# Patient Record
Sex: MALE | Race: WHITE | NOT HISPANIC OR LATINO | Employment: STUDENT | URBAN - METROPOLITAN AREA
[De-identification: names, ages, dates, MRNs, and addresses within clinical notes are randomized per-mention and may not be internally consistent; named-entity substitution may affect disease eponyms.]

---
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
## 2022-08-17 ENCOUNTER — OFFICE VISIT (OUTPATIENT)
Dept: INTERNAL MEDICINE | Facility: CLINIC | Age: 22
End: 2022-08-17

## 2022-08-17 VITALS
HEIGHT: 74 IN | DIASTOLIC BLOOD PRESSURE: 74 MMHG | SYSTOLIC BLOOD PRESSURE: 132 MMHG | WEIGHT: 193.8 LBS | OXYGEN SATURATION: 98 % | HEART RATE: 50 BPM | BODY MASS INDEX: 24.87 KG/M2

## 2022-08-17 DIAGNOSIS — Z00.00 ENCOUNTER FOR MEDICAL EXAMINATION TO ESTABLISH CARE: Primary | ICD-10-CM

## 2022-08-17 DIAGNOSIS — F41.0 PANIC ATTACKS: ICD-10-CM

## 2022-08-17 DIAGNOSIS — F41.9 ANXIETY: ICD-10-CM

## 2022-08-17 PROCEDURE — 99214 OFFICE O/P EST MOD 30 MIN: CPT | Performed by: NURSE PRACTITIONER

## 2022-08-17 RX ORDER — HYDROXYZINE HYDROCHLORIDE 25 MG/1
25 TABLET, FILM COATED ORAL 2 TIMES DAILY PRN
Qty: 60 TABLET | Refills: 0 | Status: SHIPPED | OUTPATIENT
Start: 2022-08-17 | End: 2022-09-16

## 2022-08-17 RX ORDER — ESCITALOPRAM OXALATE 10 MG/1
10 TABLET ORAL DAILY
Qty: 30 TABLET | Refills: 1 | Status: SHIPPED | OUTPATIENT
Start: 2022-08-17 | End: 2022-09-16

## 2022-08-17 NOTE — PROGRESS NOTES
"Chief Complaint  new patient visit and Anxiety    Subjective        Isaias Hardin presents to [facility]  History of Present Illness    Patient is a pleasant 21 year old male who is new to me and new to the office. He is here today for an establish care visit and for his history of anxiety.   He use to take Lexapro 10 mg tablet daily and reports it was therapeutic at the time.   He has not taken this medication in over two years.     He reports not being able to sleep d/t anxiety with \"sweaty palms\" and constant thoughts.   Denies any thoughts of self harm or harm to others at this time.     Objective   Vital Signs:  /74 (BP Location: Right arm, Patient Position: Sitting, Cuff Size: Large Adult)   Pulse 50   Ht 188 cm (74\")   Wt 87.9 kg (193 lb 12.8 oz)   SpO2 98%   BMI 24.88 kg/m²   Estimated body mass index is 24.88 kg/m² as calculated from the following:    Height as of this encounter: 188 cm (74\").    Weight as of this encounter: 87.9 kg (193 lb 12.8 oz).    BMI is within normal parameters. No other follow-up for BMI required.      Physical Exam  Vitals and nursing note reviewed.   HENT:      Head: Normocephalic.      Mouth/Throat:      Mouth: Mucous membranes are moist.   Eyes:      Pupils: Pupils are equal, round, and reactive to light.   Cardiovascular:      Rate and Rhythm: Regular rhythm. Bradycardia present.      Pulses: Normal pulses.      Heart sounds: Normal heart sounds.      Comments: No peripheral edema noted.   Pulmonary:      Effort: Pulmonary effort is normal. No respiratory distress.      Breath sounds: Normal breath sounds. No stridor. No wheezing, rhonchi or rales.      Comments: Denies SOB  Chest:      Chest wall: No tenderness.   Musculoskeletal:         General: Normal range of motion.   Skin:     General: Skin is warm.      Capillary Refill: Capillary refill takes less than 2 seconds.   Neurological:      Mental Status: He is alert and oriented to " person, place, and time.   Psychiatric:      Comments: Denies self-harm or harm to others.  Unable to sleep due to anxiety increased blood process  Sweaty palms        Result Review :           CBC (No Diff) (05/14/2020 15:50)  [facility] with Og Zelaya MD (06/18/2021)       Assessment and Plan   Diagnoses and all orders for this visit:    1. Encounter for medical examination to establish care (Primary)    2. Anxiety    3. Panic attacks    Other orders  -     escitalopram (LEXAPRO) 10 MG tablet; Take 1 tablet by mouth Daily for 30 days.  Dispense: 30 tablet; Refill: 1  -     hydrOXYzine (ATARAX) 25 MG tablet; Take 1 tablet by mouth 2 (Two) Times a Day As Needed for Itching for up to 30 days.  Dispense: 60 tablet; Refill: 0    Patient is aware to stop at the pharmacy and  his prescriptions and take as directed.  Patient will take the hydroxyzine as needed only up to 2 times a day every 8 hours.  Patient is aware this medication can cause drowsiness and only take as needed at bedtime and not with any other substances such as alcohol.  We will follow-up with patient in 6 weeks for his annual physical exam and recheck on anxiety.  Patient agrees with this treatment at this time.         Follow Up   Return in about 6 weeks (around 9/28/2022) for Annual physical.  Patient was given instructions and counseling regarding his condition or for health maintenance advice. Please see specific information pulled into the AVS if appropriate.

## 2023-09-15 ENCOUNTER — OFFICE VISIT (OUTPATIENT)
Dept: INTERNAL MEDICINE | Facility: CLINIC | Age: 23
End: 2023-09-15
Payer: COMMERCIAL

## 2023-09-15 VITALS
RESPIRATION RATE: 20 BRPM | TEMPERATURE: 96.7 F | OXYGEN SATURATION: 98 % | SYSTOLIC BLOOD PRESSURE: 120 MMHG | WEIGHT: 181 LBS | HEART RATE: 89 BPM | BODY MASS INDEX: 23.23 KG/M2 | DIASTOLIC BLOOD PRESSURE: 70 MMHG | HEIGHT: 74 IN

## 2023-09-15 DIAGNOSIS — Z11.59 NEED FOR HEPATITIS C SCREENING TEST: ICD-10-CM

## 2023-09-15 DIAGNOSIS — F41.9 ANXIETY: ICD-10-CM

## 2023-09-15 DIAGNOSIS — Z13.1 SCREENING FOR DIABETES MELLITUS: ICD-10-CM

## 2023-09-15 DIAGNOSIS — Z13.220 SCREENING, LIPID: ICD-10-CM

## 2023-09-15 DIAGNOSIS — Z00.00 ANNUAL PHYSICAL EXAM: Primary | ICD-10-CM

## 2023-09-15 RX ORDER — HYDROXYZINE HYDROCHLORIDE 25 MG/1
25 TABLET, FILM COATED ORAL 2 TIMES DAILY PRN
Qty: 60 TABLET | Refills: 1 | Status: SHIPPED | OUTPATIENT
Start: 2023-09-15 | End: 2023-10-15

## 2023-09-15 RX ORDER — ESCITALOPRAM OXALATE 10 MG/1
10 TABLET ORAL DAILY
Qty: 90 TABLET | Refills: 1 | Status: SHIPPED | OUTPATIENT
Start: 2023-09-15 | End: 2023-12-14

## 2023-09-15 NOTE — PROGRESS NOTES
"Chief Complaint  Anxiety  Annual physical exam    Subjective        Isaias Hardin presents to [facility]  History of Present Illness    Patient is a pleasant 23-year-old male who I have seen at a previous office.  Patient is here today to obtain his annual physical exam and to discuss his ongoing issue/chronic issue with anxiety that is unstable at this time.  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  His family lives here in [place] when he comes home quite often.    He is UTD on dental and vision screenings.    Exercising regularly and diet is not so well at this time.    Reports normal bowel and bladder pattern.    No other problems on today's office visit.    Objective   Vital Signs:  /70   Pulse 89   Temp 96.7 °F (35.9 °C)   Resp 20   Ht 188 cm (74.02\")   Wt 82.1 kg (181 lb)   SpO2 98%   BMI 23.23 kg/m²   Estimated body mass index is 23.23 kg/m² as calculated from the following:    Height as of this encounter: 188 cm (74.02\").    Weight as of this encounter: 82.1 kg (181 lb).       BMI is within normal parameters. No other follow-up for BMI required.      Physical Exam  Vitals and nursing note reviewed.   Constitutional:       Appearance: Normal appearance.   HENT:      Head: Normocephalic.      Right Ear: Tympanic membrane, ear canal and external ear normal. There is no impacted cerumen.      Left Ear: Tympanic membrane, ear canal and external ear normal. There is no impacted cerumen.      Nose: Nose normal.      Mouth/Throat:      Mouth: Mucous membranes are moist.      Pharynx: No oropharyngeal exudate or posterior oropharyngeal erythema.   Eyes:      Pupils: Pupils are equal, round, and reactive to light.   Cardiovascular:      Rate and Rhythm: Normal rate and regular rhythm.      Pulses: Normal pulses.      Heart sounds: Normal heart sounds.      Comments: No peripheral edema noted  Pulmonary:      " Effort: Pulmonary effort is normal. No respiratory distress.      Breath sounds: Normal breath sounds. No stridor. No wheezing, rhonchi or rales.      Comments: Denies shortness of breath  Chest:      Chest wall: No tenderness.   Musculoskeletal:         General: Normal range of motion.   Skin:     General: Skin is warm.      Capillary Refill: Capillary refill takes less than 2 seconds.   Neurological:      Mental Status: He is alert and oriented to person, place, and time.   Psychiatric:         Behavior: Behavior normal.      Result Review :               Assessment and Plan   Diagnoses and all orders for this visit:    1. Annual physical exam (Primary)  -     CBC & Differential  -     Comprehensive Metabolic Panel  -     Hemoglobin A1c  -     Lipid Panel  -     Hepatitis C Antibody    2. Need for hepatitis C screening test  -     CBC & Differential  -     Comprehensive Metabolic Panel  -     Hemoglobin A1c  -     Lipid Panel  -     Hepatitis C Antibody    3. Screening, lipid  -     CBC & Differential  -     Comprehensive Metabolic Panel  -     Hemoglobin A1c  -     Lipid Panel  -     Hepatitis C Antibody    4. Screening for diabetes mellitus  -     CBC & Differential  -     Comprehensive Metabolic Panel  -     Hemoglobin A1c  -     Lipid Panel  -     Hepatitis C Antibody    5. Anxiety  Assessment & Plan:  Chronic/unstable  Patient has been on Lexapro 10 mg in the past and hydroxyzine as needed for moments of panic.  Patient is requesting a refill of this medication at this time.  Patient is aware the hydroxyzine will cause drowsiness and will only take it as needed for moments of panic.  Denies any thoughts of suicidal ideation at this time.      Other orders  -     escitalopram (LEXAPRO) 10 MG tablet; Take 1 tablet by mouth Daily for 90 days.  Dispense: 90 tablet; Refill: 1  -     hydrOXYzine (ATARAX) 25 MG tablet; Take 1 tablet by mouth 2 (Two) Times a Day As Needed for Itching for up to 30 days.  Dispense: 60  tablet; Refill: 1    Patient will stop at the pharmacy and  his prescriptions for Lexapro and hydroxyzine and take as directed.  Patient will return in 8 weeks for a video conference to discuss anxiety and medication management.  Patient is aware if he develops any worsening symptoms of anxiety he will contact the office immediately.  Screening testing was completed on today's office visit we will contact him when those return.  Patient agrees to treatment plan at this time.  Patient will focus on managing healthy dietary pattern at this time.  He works out regularly and will continue to do so.       I spent 30 minutes caring for Isaias on this date of service. This time includes time spent by me in the following activities:preparing for the visit, reviewing tests, obtaining and/or reviewing a separately obtained history, performing a medically appropriate examination and/or evaluation , counseling and educating the patient/family/caregiver, ordering medications, tests, or procedures, referring and communicating with other health care professionals , documenting information in the medical record, independently interpreting results and communicating that information with the patient/family/caregiver, and care coordination  Follow Up   Return in about 8 weeks (around 11/10/2023) for Video visit.  Patient was given instructions and counseling regarding his condition or for health maintenance advice. Please see specific information pulled into the AVS if appropriate.

## 2023-09-15 NOTE — ASSESSMENT & PLAN NOTE
Chronic/unstable  Patient has been on Lexapro 10 mg in the past and hydroxyzine as needed for moments of panic.  Patient is requesting a refill of this medication at this time.  Patient is aware the hydroxyzine will cause drowsiness and will only take it as needed for moments of panic.  Denies any thoughts of suicidal ideation at this time.

## 2023-09-16 LAB
ALBUMIN SERPL-MCNC: 5.1 G/DL (ref 4.3–5.2)
ALBUMIN/GLOB SERPL: 2.4 {RATIO} (ref 1.2–2.2)
ALP SERPL-CCNC: 100 IU/L (ref 44–121)
ALT SERPL-CCNC: 16 IU/L (ref 0–44)
AST SERPL-CCNC: 23 IU/L (ref 0–40)
BASOPHILS # BLD AUTO: 0.1 X10E3/UL (ref 0–0.2)
BASOPHILS NFR BLD AUTO: 1 %
BILIRUB SERPL-MCNC: 0.6 MG/DL (ref 0–1.2)
BUN SERPL-MCNC: 15 MG/DL (ref 6–20)
BUN/CREAT SERPL: 12 (ref 9–20)
CALCIUM SERPL-MCNC: 10.1 MG/DL (ref 8.7–10.2)
CHLORIDE SERPL-SCNC: 100 MMOL/L (ref 96–106)
CHOLEST SERPL-MCNC: 173 MG/DL (ref 100–199)
CO2 SERPL-SCNC: 28 MMOL/L (ref 20–29)
CREAT SERPL-MCNC: 1.24 MG/DL (ref 0.76–1.27)
EGFRCR SERPLBLD CKD-EPI 2021: 85 ML/MIN/1.73
EOSINOPHIL # BLD AUTO: 0 X10E3/UL (ref 0–0.4)
EOSINOPHIL NFR BLD AUTO: 0 %
ERYTHROCYTE [DISTWIDTH] IN BLOOD BY AUTOMATED COUNT: 12.1 % (ref 11.6–15.4)
GLOBULIN SER CALC-MCNC: 2.1 G/DL (ref 1.5–4.5)
GLUCOSE SERPL-MCNC: 85 MG/DL (ref 70–99)
HBA1C MFR BLD: 5.5 % (ref 4.8–5.6)
HCT VFR BLD AUTO: 47.1 % (ref 37.5–51)
HCV IGG SERPL QL IA: NON REACTIVE
HDLC SERPL-MCNC: 85 MG/DL
HGB BLD-MCNC: 15.9 G/DL (ref 13–17.7)
IMM GRANULOCYTES # BLD AUTO: 0 X10E3/UL (ref 0–0.1)
IMM GRANULOCYTES NFR BLD AUTO: 0 %
LDLC SERPL CALC-MCNC: 78 MG/DL (ref 0–99)
LYMPHOCYTES # BLD AUTO: 2.2 X10E3/UL (ref 0.7–3.1)
LYMPHOCYTES NFR BLD AUTO: 40 %
MCH RBC QN AUTO: 30.2 PG (ref 26.6–33)
MCHC RBC AUTO-ENTMCNC: 33.8 G/DL (ref 31.5–35.7)
MCV RBC AUTO: 89 FL (ref 79–97)
MONOCYTES # BLD AUTO: 0.6 X10E3/UL (ref 0.1–0.9)
MONOCYTES NFR BLD AUTO: 10 %
NEUTROPHILS # BLD AUTO: 2.7 X10E3/UL (ref 1.4–7)
NEUTROPHILS NFR BLD AUTO: 49 %
PLATELET # BLD AUTO: 292 X10E3/UL (ref 150–450)
POTASSIUM SERPL-SCNC: 5.2 MMOL/L (ref 3.5–5.2)
PROT SERPL-MCNC: 7.2 G/DL (ref 6–8.5)
RBC # BLD AUTO: 5.27 X10E6/UL (ref 4.14–5.8)
SODIUM SERPL-SCNC: 140 MMOL/L (ref 134–144)
TRIGL SERPL-MCNC: 46 MG/DL (ref 0–149)
VLDLC SERPL CALC-MCNC: 10 MG/DL (ref 5–40)
WBC # BLD AUTO: 5.5 X10E3/UL (ref 3.4–10.8)

## 2023-11-22 RX ORDER — ESCITALOPRAM OXALATE 10 MG/1
10 TABLET ORAL DAILY
Qty: 30 TABLET | Refills: 0 | Status: SHIPPED | OUTPATIENT
Start: 2023-11-22 | End: 2024-02-20

## 2024-01-11 ENCOUNTER — PATIENT MESSAGE (OUTPATIENT)
Dept: INTERNAL MEDICINE | Facility: CLINIC | Age: 24
End: 2024-01-11
Payer: COMMERCIAL

## 2024-01-19 RX ORDER — ESCITALOPRAM OXALATE 10 MG/1
10 TABLET ORAL DAILY
Qty: 90 TABLET | Refills: 0 | Status: SHIPPED | OUTPATIENT
Start: 2024-01-19 | End: 2024-04-18

## 2024-04-22 RX ORDER — ESCITALOPRAM OXALATE 10 MG/1
10 TABLET ORAL DAILY
Qty: 30 TABLET | Refills: 0 | Status: SHIPPED | OUTPATIENT
Start: 2024-04-22 | End: 2024-07-21

## 2024-06-28 ENCOUNTER — OFFICE VISIT (OUTPATIENT)
Age: 24
End: 2024-06-28
Payer: COMMERCIAL

## 2024-06-28 VITALS
SYSTOLIC BLOOD PRESSURE: 142 MMHG | DIASTOLIC BLOOD PRESSURE: 88 MMHG | BODY MASS INDEX: 24 KG/M2 | HEART RATE: 54 BPM | WEIGHT: 187 LBS | HEIGHT: 74 IN | OXYGEN SATURATION: 99 %

## 2024-06-28 DIAGNOSIS — F41.1 GENERALIZED ANXIETY DISORDER WITH PANIC ATTACKS: Primary | ICD-10-CM

## 2024-06-28 DIAGNOSIS — F41.0 GENERALIZED ANXIETY DISORDER WITH PANIC ATTACKS: Primary | ICD-10-CM

## 2024-06-28 RX ORDER — ESCITALOPRAM OXALATE 20 MG/1
20 TABLET ORAL DAILY
Qty: 30 TABLET | Refills: 1 | Status: SHIPPED | OUTPATIENT
Start: 2024-06-28 | End: 2024-08-27

## 2024-06-28 NOTE — PROGRESS NOTES
"Chief Complaint: \"anxiety\"    Subjective      Isaias Hardin presents to [facility] for a mental health evaluation.     HPI :     Pt is a 23 y.o. yo male who is being seen here at the clinic for a mental health evaluation. Pt has a history of depression and anxiety that started a few years ago. Pt has been on Lexapro 10mg daily for about 3-4 years now. States the lexapro somewhat helped with his anxiety but states that his anxiety has worsened over the past few months. Reports the follow symptoms of anxiety.  Anxious nearly every day, worrying about multiple different things and having trouble controlling the worry, trouble relaxing, restlessness, easily annoyed, and feeling afraid as if something awful might happen. Reports to feel anxious about his future and reports a lot of self-doubt. States he workouts out 2-3 times a day because it seems to be the only thing that helps with his anxiety. Reports to have panic attacks 1-2 times each week. Reports the following symptoms during his panic attacks: Palpitations, Sweating, Sensations of shortness of breath or smothering, and Chest pain or discomfort. Pt's last panic attack was 2 days ago. States that he has breathing techniques to use during his panic attacks that are helpful. Pt states that he feels depressed at times but it is not a consistent feeling and only lasts for a day or two. Pt reports the following depressive symptoms: depressed mood, diminished interest or pleasure in activities, poor sleep, psychomotor agitation, fatigue or loss of energy, inappropriate guilt , and diminished ability to concentrate. Pt reports to be sleeping 6-7 hours each night. Reports that his appetite is good and eats 4-5 meals each day. Denies SI/HI/AVH. Denies any side effects to Lexapro. Pt is currently in college at [employer] and is home for summer break. Pt reports that he passed all his classes last year with good grades. States he has found a " therapist and is starting therapy soon.     Psychiatric Review of Systems:   Depression: depressed mood, diminished interest or pleasure in activities, poor sleep, psychomotor agitation, fatigue or loss of energy, inappropriate guilt , and diminished ability to concentrate   Emelina: Patient denies symptoms of emelina.  Anxiety: Excessive anxiety and worry, Difficulty controlling the worry, restlessness, easily fatigued, difficulty concentrating , mind going blank, irritability, muscle tension, and sleep disturbance   Psychosis: Patient denies symptoms of psychosis.    Panic Attacks: Palpitations, Sweating, Sensations of shortness of breath or smothering, and Chest pain or discomfort two days ago... get them 1-2 times a week    Agoraphobia: Patient denies symptoms of agoraphobia.   OCD: Patient denies symptoms of OCD.   Eating Disorders: Patient denies any symptoms of an eating disorder.   PTSD: Patient denies symptoms of PTSD.  Specific Phobias: Patient denies any phobias.  Borderline Personality DO: Patient denies symptoms of borderline personality disorder.  Antisocial Personality DO: Patient denies symptoms of antisocial personality.    Past Psychiatric History:   Diagnoses: DALTON, MDD  Hospitalizations: Denies.   Counseling: Denies.   Suicide attempts: Denies.   Self Harm: Denies.     Previous Psych Meds: Lexapro    Substance Use/Abuse:   Caffeine: 1-2 cups of coffee in the morning.   Alcohol: about once a month.   Tobacco: Nicotine pouches once every 3 hours.   Illicit substances: Marijuana 1-2 times a week.   IVDU: Denies.   History of formal substance abuse treatment: Denies.      Social History:    Family structure: Lives with mother and father but during the school year he lives with 2 roommates.    Education: [employer] major in sports management.    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    Supportive relationships: Mother and friends.    Episcopal/Elvi: Yarsanism    Abuse History: Denies.     Legal History:  "   Incarceration: Denies.     History of violence: Denies.      History: Denies.     Past Medical/Developmental History:    Chronic Illnesses: Listed below.    Head trauma: Denies.     Past Medical History:   Diagnosis Date    ACL (anterior cruciate ligament) tear     LEFT    Acne     ADHD     NO MEDICINE NEW DIAGNOSIS    Family history of von Willebrand disease     PT MOTHER STATED \"HIS DAD WAS PROBABLE BUT WAS NEVER CONFIRMED'.      Family Psychiatric History:    Psychiatric history: Father and sister have anxiety.    Substance use: Denies.     Current Medications:   Current Outpatient Medications   Medication Sig Dispense Refill    ciprofloxacin (CIPRO) 250 MG tablet Take 1 tablet by mouth 2 (Two) Times a Day. (Patient not taking: Reported on 6/28/2024) 10 tablet 0    cyclobenzaprine (FLEXERIL) 10 MG tablet 1 tablet. (Patient not taking: Reported on 6/28/2024)      diclofenac (VOLTAREN) 75 MG EC tablet 1 tablet. (Patient not taking: Reported on 6/28/2024)      escitalopram (Lexapro) 20 MG tablet Take 1 tablet by mouth Daily for 60 days. 30 tablet 1     No current facility-administered medications for this visit.     Review of Systems   Constitutional:  Negative for appetite change.   HENT:  Negative for sore throat.    Eyes:  Negative for visual disturbance.   Respiratory:  Negative for chest tightness and shortness of breath.    Cardiovascular:  Negative for chest pain and palpitations.   Gastrointestinal:  Negative for abdominal pain, constipation, diarrhea and nausea.   Genitourinary:  Negative for difficulty urinating.   Neurological:  Negative for dizziness, tremors and memory problem.   Psychiatric/Behavioral:  Negative for hallucinations and suicidal ideas.         Mental Status Exam:   MENTAL STATUS EXAM   General Appearance:  Cleanly groomed and dressed  Eye Contact:  Good eye contact  Attitude:  Cooperative  Motor Activity:  Normal gait, posture  Muscle Strength:  Normal  Speech:  Normal rate, " "tone, volume  Language:  Spontaneous  Mood and affect:  Normal, pleasant (Reports mood to be \"good\")  Hopelessness:  Denies  Loneliness: Denies  Hallucinations:  None  Suicidal Ideations:  Not present  Homicidal Ideation:  Not present  Sensorium:  Alert and clear  Orientation:  Person, place, time and situation  Attention Span/ Concentration:  Good  Fund of Knowledge:  Appropriate for age and educational level  Intellectual Functioning:  Average range  Insight:  Good  Judgement:  Good  Reliability:  Good  Impulse Control:  Good       Objective   Vital Signs:   /88   Pulse 54   Ht 188 cm (74\")   Wt 84.8 kg (187 lb)   SpO2 99%   BMI 24.01 kg/m²       Result Review :                 Assessment and Plan      PHQ-9 Score:   PHQ-9 Total Score: 17    PHQ-9 Depression Screening  Little interest or pleasure in doing things? 2-->more than half the days   Feeling down, depressed, or hopeless? 1-->several days   Trouble falling or staying asleep, or sleeping too much? 3-->nearly every day   Feeling tired or having little energy? 3-->nearly every day   Poor appetite or overeating? 0-->not at all   Feeling bad about yourself - or that you are a failure or have let yourself or your family down? 3-->nearly every day   Trouble concentrating on things, such as reading the newspaper or watching television? 3-->nearly every day   Moving or speaking so slowly that other people could have noticed? Or the opposite - being so fidgety or restless that you have been moving around a lot more than usual? 2-->more than half the days   Thoughts that you would be better off dead, or of hurting yourself in some way? 0-->not at all   PHQ-9 Total Score 17   If you checked off any problems, how difficult have these problems made it for you to do your work, take care of things at home, or get along with other people? very difficult      Depression Screening:  Patient screened positive for depression based on a PHQ-9 score of 17 on " 6/28/2024. Follow-up recommendations include: Prescribed antidepressant medication treatment.    DALTON-7      Over the last two weeks, how often have you been bothered by the following problems?  Feeling nervous, anxious or on edge: Nearly every day  Not being able to stop or control worrying: Nearly every day  Worrying too much about different things: Nearly every day  Trouble Relaxing: Nearly every day  Being so restless that it is hard to sit still: Nearly every day  Becoming easily annoyed or irritable: Nearly every day  Feeling afraid as if something awful might happen: Nearly every day  DALTON 7 Total Score: 21  If you checked any problems, how difficult have these problems made it for you to do your work, take care of things at home, or get along with other people: Very difficult               Tobacco Cessation:  Encouraged pt to reduce his use of nicotine. Educated pt on risks of nicotine products.     Impression/Treatment Plan:  Pt is a 22 yo male with a history of depression and anxiety that started a few years ago. Pt has been on Lexapro 10mg daily for about 3-4 years now. States the lexapro somewhat helped with his anxiety but states that his anxiety has worsened over the past few months. Pt's symptoms align with the diagnosis of generalized anxiety disorder with panic attacks. Will increase Lexapro to 20mg daily. Encouraged pt to start therapy. Will see pt in 6 weeks.     Short-term goals: Continue to develop rapport with patient.    Long-term goals: Increase Lexapro dosage and start therapy.     Weakness: None noted    Strength: Great support from family.     Diagnoses and all orders for this visit:    1. Generalized anxiety disorder with panic attacks (Primary)    Other orders  -     escitalopram (Lexapro) 20 MG tablet; Take 1 tablet by mouth Daily for 60 days.  Dispense: 30 tablet; Refill: 1        MEDS ORDERED DURING VISIT:  New Medications Ordered This Visit   Medications    escitalopram (Lexapro) 20 MG  tablet     Sig: Take 1 tablet by mouth Daily for 60 days.     Dispense:  30 tablet     Refill:  1         Follow Up   Return in about 6 weeks (around 8/9/2024) for Recheck.  Patient was given instructions and counseling regarding his condition or for health maintenance advice. Please see specific information pulled into the AVS if appropriate.     PATIENT EDUCATION:  - Discussed medication options and treatment plan of prescribed medication as well as the risks, benefits, and side effects   - Encouraged pt to continue supportive psychotherapy efforts and medications as indicated.  - Educated pt on signs and symptoms of serotonin syndrome and notified pt to go to the ER if experiencing these symptoms.   - Notified pt that antidepressants can sometimes cause worsening SI and to monitor for this.     Treatment and medication options discussed during today's visit. Patient acknowledged and verbally consented to continue with current treatment plan and was educated on the importance of compliance with treatment and follow-up appointments. Patient is agreeable to call the office with any worsening of symptoms or onset of side effects. Patient is agreeable to call 911 or go to the nearest ER should he/she begin having SI/HI.    Davie reviewed and is appropriate.    BRI Robert, PMP-BC    Part of this note may be an electronic transcription/translation of spoken language to printed text using the Dragon Dictation System.

## 2024-08-09 ENCOUNTER — TELEPHONE (OUTPATIENT)
Age: 24
End: 2024-08-09

## 2024-08-09 ENCOUNTER — TELEMEDICINE (OUTPATIENT)
Age: 24
End: 2024-08-09
Payer: COMMERCIAL

## 2024-08-09 DIAGNOSIS — F41.1 GENERALIZED ANXIETY DISORDER WITH PANIC ATTACKS: Primary | ICD-10-CM

## 2024-08-09 DIAGNOSIS — F41.0 GENERALIZED ANXIETY DISORDER WITH PANIC ATTACKS: Primary | ICD-10-CM

## 2024-08-09 NOTE — TELEPHONE ENCOUNTER
Left message to let patient know the provider is sick and would like to see if patient will do a virtual visit today or if patient wants to reschedule.8/9/24

## 2024-08-09 NOTE — PROGRESS NOTES
"     Office  Follow Up Visit      Patient Name: Isaias Hardin  : 2000   MRN: [id number]     Referring Provider: Maliha Baer APRN    This provider is located at [facility], [address] [place] [address], [facility] [address], [place], using a secure MyChart Video Visit through [employer] Patient is being seen remotely via telehealth at their home address in [place], and stated they are in a secure environment for this session. The patient's condition being diagnosed/treated is appropriate for telemedicine. The provider identified herself as well as her credentials. The patient, and/or patients guardian, consent to be seen remotely, and when consent is given they understand that the consent allows for patient identifiable information to be sent to a third party as needed.   They may refuse to be seen remotely at any time. The electronic data is encrypted and password protected, and the patient and/or guardian has been advised of the potential risks to privacy not withstanding such measures.     Chief Complaint: \"I've been doing a lot better\"    ICD-10-CM ICD-9-CM   1. Generalized anxiety disorder with panic attacks  F41.1 300.02    F41.0 300.01      History of Present Illness:   Isaias Hardin is a 23 y.o. male who is here today for follow up.  Pt has a history of depression and anxiety that started a few years ago. Pt has been on Lexapro 10mg daily for about 3-4 years now.  Patient came in for an initial evaluation on 2024.  During this appointment patient's Lexapro was increased to 20 mg daily.  Today patient reports an improvement in his anxiety and depressive symptoms over the past couple weeks.  Patient denies feeling depressed over the past two weeks.  States anxiety is still present but much more manageable now.  Patient's primary complaint today is restlessness and finding it difficult to relax.  States he always feels \"on the go\" and finds it difficult for him to not think about what " "is next.  States that he is often anxious about his future with this being his last year of college.  Patient recently started therapy.  Patient's therapist believes that patient may have ADHD due to him always wanting to be active and feeling like he is driven by a motor.  Reports that he has some difficulty concentrating but still able to get things done that he needs to do in a timely manner.  Reports that he is passing all his classes in school and gets \"Bs and Cs\" but patient does admit that he does not always give his best effort in school.  States he have some difficulty concentrating in childhood and some people did speculate that he had ADHD but never completed any sort of testing and was never diagnosed by pediatrician.  States he has been very active and energetic ever since childhood but never to a point where he was problematic.  Patient does not find that his increased energy/\"always feeling on the go\" behavior is problematic for the most part but with this recent increased anxiety revolving around his future/this being his last year of college, he has found it difficult to be able to relax.  Reports to be sleeping about 8 hours each night.  States his appetite is good.  Denies SI/HI/AVH.    Subjective      Review of Systems:   Review of Systems   Constitutional:  Negative for appetite change.   Respiratory:  Negative for chest tightness and shortness of breath.    Gastrointestinal:  Negative for abdominal pain, constipation, diarrhea, nausea and vomiting.   Genitourinary:  Negative for difficulty urinating.   Neurological:  Negative for headaches.   Psychiatric/Behavioral:  Negative for hallucinations and suicidal ideas.        PHQ-9 Depression Screening  Little interest or pleasure in doing things? 1-->several days   Feeling down, depressed, or hopeless? 0-->not at all   Trouble falling or staying asleep, or sleeping too much? 2-->more than half the days   Feeling tired or having little energy? " 0-->not at all   Poor appetite or overeating? 0-->not at all   Feeling bad about yourself - or that you are a failure or have let yourself or your family down? 1-->several days   Trouble concentrating on things, such as reading the newspaper or watching television? 2-->more than half the days   Moving or speaking so slowly that other people could have noticed? Or the opposite - being so fidgety or restless that you have been moving around a lot more than usual? 3-->nearly every day   Thoughts that you would be better off dead, or of hurting yourself in some way? 0-->not at all   PHQ-9 Total Score 9   If you checked off any problems, how difficult have these problems made it for you to do your work, take care of things at home, or get along with other people? somewhat difficult     DALTON-7      Over the last two weeks, how often have you been bothered by the following problems?  Feeling nervous, anxious or on edge: Several days  Not being able to stop or control worrying: Several days  Worrying too much about different things: Several days  Trouble Relaxing: Nearly every day  Being so restless that it is hard to sit still: Nearly every day  Becoming easily annoyed or irritable: Several days  Feeling afraid as if something awful might happen: Not at all  DALTON 7 Total Score: 10  If you checked any problems, how difficult have these problems made it for you to do your work, take care of things at home, or get along with other people: Somewhat difficult    Patient History:   The following portions of the patient's history were reviewed and updated as appropriate: allergies, current medications, past family history, past medical history, past social history, past surgical history and problem list.     Social History     Socioeconomic History    Marital status: Single   Tobacco Use    Smoking status: Former     Types: Cigarettes    Smokeless tobacco: Never    Tobacco comments:     SMOKES OCCASIONALLY   Vaping Use    Vaping  status: Former    Substances: Nicotine, THC, Flavoring    Devices: Disposable   Substance and Sexual Activity    Alcohol use: Yes    Drug use: Yes     Types: Marijuana     Comment: smokes about once a week    Sexual activity: Yes     Partners: Female       Medications:     Current Outpatient Medications:     ciprofloxacin (CIPRO) 250 MG tablet, Take 1 tablet by mouth 2 (Two) Times a Day. (Patient not taking: Reported on 6/28/2024), Disp: 10 tablet, Rfl: 0    cyclobenzaprine (FLEXERIL) 10 MG tablet, 1 tablet. (Patient not taking: Reported on 6/28/2024), Disp: , Rfl:     diclofenac (VOLTAREN) 75 MG EC tablet, 1 tablet. (Patient not taking: Reported on 6/28/2024), Disp: , Rfl:     escitalopram (Lexapro) 20 MG tablet, Take 1 tablet by mouth Daily for 60 days., Disp: 30 tablet, Rfl: 1    Objective     Physical Exam:  Vital Signs: There were no vitals filed for this visit.  There is no height or weight on file to calculate BMI.       Mental Status Exam:   MENTAL STATUS EXAM   General Appearance:  Cleanly groomed and dressed  Eye Contact:  Good eye contact  Attitude:  Cooperative  Motor Activity:  Normal gait, posture  Muscle Strength:  Normal  Speech:  Normal rate, tone, volume  Language:  Spontaneous  Mood and affect:  Normal, pleasant  Hopelessness:  Denies  Loneliness: Denies  Thought Process:  Logical  Associations/ Thought Content:  No delusions  Hallucinations:  None  Suicidal Ideations:  Not present  Homicidal Ideation:  Not present  Sensorium:  Alert and clear  Orientation:  Person, place, situation and time  Attention Span/ Concentration:  Good  Fund of Knowledge:  Appropriate for age and educational level  Intellectual Functioning:  Average range  Insight:  Good  Judgement:  Good  Reliability:  Good  Impulse Control:  Good       @RESULASTCBCDIFFPANEL,TSH,LABLIPI,[id number],IFTXDWBD65,MG,FOLATE,PROLACTIN,CRPRESULT,CMP,[id number])@    Lab Results   Component Value Date    GLUCOSE 85 09/15/2023    BUN 15  09/15/2023    CREATININE 1.24 09/15/2023    EGFRRESULT 85 09/15/2023    BCR 12 09/15/2023    K 5.2 09/15/2023    CO2 28 09/15/2023    CALCIUM 10.1 09/15/2023    PROTENTOTREF 7.2 09/15/2023    ALBUMIN 5.1 09/15/2023    BILITOT 0.6 09/15/2023    AST 23 09/15/2023    ALT 16 09/15/2023       Lab Results   Component Value Date    WBC 5.5 09/15/2023    HGB 15.9 09/15/2023    HCT 47.1 09/15/2023    MCV 89 09/15/2023     09/15/2023       Lab Results   Component Value Date    CHLPL 173 09/15/2023    TRIG 46 09/15/2023    HDL 85 09/15/2023    LDL 78 09/15/2023                  Assessment / Plan      Assessment:  Patient is a 23-year-old male with a history of depression and anxiety that started a few years ago. Pt has been on Lexapro 10mg daily for about 3-4 years now.  Patient came in for an initial evaluation on 6/28/2024.  During this appointment patient's Lexapro was increased to 20 mg daily.  Today patient reports an improvement in his anxiety and depressive symptoms over the past couple weeks.  Patient's PHQ-9 and DALTON-7 scores have improved since last visit.  Will continue Lexapro 20 mg daily.  Patient will most likely continue to see benefit from the medication over the upcoming weeks.  Patient recently started therapy and his therapist believes that he may have ADHD.  After interviewing patient regarding ADHD symptoms, I do believe that some hyperactive symptoms may be present but is difficult to tell if this is actually anxiety worsening what used to be nonproblematic hyperactive symptoms.  It seems that these hyperactive symptoms have not been problematic until patient started having future related anxiety with this being his last year of college. Provided patient with his college's disability center contact information where he can ask for ADHD testing information and get it completed at an affordable price.  Notified patient if the wait list is too long at his college to reach out to our clinic for some  private practices he can get this completed at.  Will see what the Lexapro continues to do for patient and his anxiety and see patient in 1 month.     Diagnoses and all orders for this visit:    1. Generalized anxiety disorder with panic attacks (Primary)       Differential:   ADHD- will have patient complete ADHD testing.    Plan/patient education:   Continue Lexapro 20 mg daily.  Complete ADHD testing.  Discussed medication options and treatment plan of prescribed medication as well as the risks, benefits, and side effects   Encouraged pt to continue supportive psychotherapy efforts and medications as indicated.  Educated pt on signs and symptoms of serotonin syndrome and notified pt to go to the ER if experiencing these symptoms.   Notified pt that antidepressants can sometimes cause worsening SI and to monitor for this.     Short-term goals: Continue to develop rapport with patient.     Long-term goals: See symptom reduction with medication and therapy.     Weakness: None noted     Strength: Great support from family.    Continue supportive psychotherapy efforts and medications as indicated. Treatment and medication options discussed during today's visit. Patient ackowledged and verbally consented to continue with current treatment plan and was educated on the importance of compliance with treatment and follow-up appointments. Patient seems reasonably able to adhere to treatment plan.      Medication Considerations:  Discussed medication options and treatment plan of prescribed medication(s) as well as the risks, benefits, and potential side effects. Patient is agreeable to call the office with any worsening of symptoms or onset of side effects. Patient is agreeable to call 911 or go to the nearest ER should he/she begin having SI/HI.    Quality Measures:   Encouraged pt to reduce his use of nicotine. Educated pt on risks of nicotine products.     Davie reviewed and is appropriate.    Follow Up:   Return in  about 4 weeks (around 9/6/2024) for Recheck.    Copied text in this note has been reviewed and is accurate as of 8/10/2024.    Part of this note may be an electronic transcription/translation of spoken language to printed text using the Dragon Dictation System.    BRI Robert, PMHNP-BC

## 2024-08-10 PROBLEM — F41.1 GENERALIZED ANXIETY DISORDER WITH PANIC ATTACKS: Status: ACTIVE | Noted: 2024-08-10

## 2024-08-10 PROBLEM — F41.0 GENERALIZED ANXIETY DISORDER WITH PANIC ATTACKS: Status: ACTIVE | Noted: 2024-08-10

## 2024-08-12 ENCOUNTER — TELEPHONE (OUTPATIENT)
Age: 24
End: 2024-08-12
Payer: COMMERCIAL

## 2024-08-12 NOTE — TELEPHONE ENCOUNTER
Pt called in. Has external results he wants to send in. Instructed pt that I would send [employer] message and he would be able to reply with attachments.

## 2024-08-12 NOTE — TELEPHONE ENCOUNTER
Can we call pt to set up another appointment within the next couple of weeks to discuss his neuropsych testing results? Thank you!

## 2024-08-13 ENCOUNTER — TELEPHONE (OUTPATIENT)
Age: 24
End: 2024-08-13
Payer: COMMERCIAL

## 2024-08-26 ENCOUNTER — TELEPHONE (OUTPATIENT)
Age: 24
End: 2024-08-26
Payer: COMMERCIAL

## 2024-08-26 DIAGNOSIS — F41.0 GENERALIZED ANXIETY DISORDER WITH PANIC ATTACKS: Primary | ICD-10-CM

## 2024-08-26 DIAGNOSIS — F41.1 GENERALIZED ANXIETY DISORDER WITH PANIC ATTACKS: Primary | ICD-10-CM

## 2024-08-26 RX ORDER — ESCITALOPRAM OXALATE 20 MG/1
20 TABLET ORAL DAILY
Qty: 30 TABLET | Refills: 2 | Status: SHIPPED | OUTPATIENT
Start: 2024-08-26 | End: 2024-11-24

## 2024-08-26 NOTE — TELEPHONE ENCOUNTER
Rx Refill Note  Requested Prescriptions     Pending Prescriptions Disp Refills    escitalopram (Lexapro) 20 MG tablet 30 tablet 1     Sig: Take 1 tablet by mouth Daily for 60 days.      Last office visit with prescribing clinician: 6/28/2024   Last telemedicine visit with prescribing clinician: 8/9/2024   Next office visit with prescribing clinician:     Thien Martinez MA  08/26/24, 10:20 EDT

## 2024-08-27 ENCOUNTER — TELEPHONE (OUTPATIENT)
Age: 24
End: 2024-08-27
Payer: COMMERCIAL

## 2024-08-29 ENCOUNTER — TELEPHONE (OUTPATIENT)
Age: 24
End: 2024-08-29
Payer: COMMERCIAL

## 2024-08-29 NOTE — TELEPHONE ENCOUNTER
Left VM for patient to return call to [facility] a [facility]. There have been a few phone calls made to reach patient.

## 2024-09-10 ENCOUNTER — TELEMEDICINE (OUTPATIENT)
Age: 24
End: 2024-09-10
Payer: COMMERCIAL

## 2024-09-10 DIAGNOSIS — F90.0 ATTENTION DEFICIT HYPERACTIVITY DISORDER (ADHD), PREDOMINANTLY INATTENTIVE TYPE: Primary | ICD-10-CM

## 2024-09-10 PROCEDURE — 90792 PSYCH DIAG EVAL W/MED SRVCS: CPT

## 2024-09-10 RX ORDER — ATOMOXETINE 40 MG/1
40 CAPSULE ORAL DAILY
Qty: 30 CAPSULE | Refills: 1 | Status: SHIPPED | OUTPATIENT
Start: 2024-09-10 | End: 2024-11-09

## 2024-09-10 NOTE — PROGRESS NOTES
"     Office  Follow Up Visit      Patient Name: Isaias Hardin  : 2000   MRN: [id number]     Referring Provider: Maliha Baer APRN    This provider is located at [facility], [address] [place] [address], using a secure MyChart Video Visit through [employer]. Patient is being seen remotely via telehealth at [address] [place] [address], and stated they are in a secure environment for this session. The patient's condition being diagnosed/treated is appropriate for telemedicine. The provider identified herself as well as her credentials. The patient, and/or patients guardian, consent to be seen remotely, and when consent is given they understand that the consent allows for patient identifiable information to be sent to a third party as needed.   They may refuse to be seen remotely at any time. The electronic data is encrypted and password protected, and the patient and/or guardian has been advised of the potential risks to privacy not withstanding such measures.    Chief Complaint:  \"I found my ADHD testing results\"     ICD-10-CM ICD-9-CM   1. Attention deficit hyperactivity disorder (ADHD), predominantly inattentive type  F90.0 314.00      History of Present Illness:   Isaias Hardin is a 24 y.o. male who is here today for follow up related. Pt has a history of depression and anxiety that started a few years ago. Pt was last seen on 24 and continued on Lexapro 20mg daily.  Patient reports to still feel anxious most days and has difficulty controlling his anxiety.  States he has difficulty sitting still and feels as if he is driven by a motor.  Reports that he works out twice a day because he constantly feels the need to move.  Patient feels that a lot of his anxiety is attributed to his uncontrolled ADHD symptoms.  States he had neuropsych testing completed in 2019 that does confirm ADHD diagnosis.  Patient sent over neuropsych testing results to the " "office. Pt is experiencing the following inattentive symptoms: difficulty sustaining attention, does not seem to listen when spoken to directly, does not follow through on instructions and fails to finish responsibilities , difficulty organizing tasks and activities, often avoids tasks that require sustained mental effort, often loses things, easily distracted, and often forgetful. Pt reports the following hyperactive/impulsive symptoms: often fidgets or squirms in seat, often unable to engage in leisure activities quietly, is often \"on the go\", acting as if \"driven by a motor\", and difficulty waiting his or her turn . These symptoms have caused impairment in important areas of daily functioning.  Reports that he has been experiencing these symptoms since childhood.  Reports to be sleeping about 6 hours each night.  Denies SI/HI/AVH.  Denies any side effects to Lexapro.    Subjective      Review of Systems:   Review of Systems   Constitutional:  Negative for appetite change.   Respiratory:  Negative for chest tightness and shortness of breath.    Gastrointestinal:  Negative for abdominal pain, constipation, diarrhea, nausea and vomiting.   Genitourinary:  Negative for difficulty urinating.   Neurological:  Negative for headaches.   Psychiatric/Behavioral:  Negative for hallucinations, sleep disturbance and suicidal ideas.      PHQ-9 Depression Screening  Little interest or pleasure in doing things? 1-->several days   Feeling down, depressed, or hopeless? 0-->not at all   Trouble falling or staying asleep, or sleeping too much? 2-->more than half the days   Feeling tired or having little energy? 0-->not at all   Poor appetite or overeating? 0-->not at all   Feeling bad about yourself - or that you are a failure or have let yourself or your family down? 1-->several days   Trouble concentrating on things, such as reading the newspaper or watching television? 2-->more than half the days   Moving or speaking so slowly " that other people could have noticed? Or the opposite - being so fidgety or restless that you have been moving around a lot more than usual? 2-->more than half the days   Thoughts that you would be better off dead, or of hurting yourself in some way? 0-->not at all   PHQ-9 Total Score 8   If you checked off any problems, how difficult have these problems made it for you to do your work, take care of things at home, or get along with other people? somewhat difficult     DALTON-7      Over the last two weeks, how often have you been bothered by the following problems?  Feeling nervous, anxious or on edge: More than half the days  Not being able to stop or control worrying: More than half the days  Worrying too much about different things: More than half the days  Trouble Relaxing: More than half the days  Being so restless that it is hard to sit still: More than half the days  Becoming easily annoyed or irritable: Several days  Feeling afraid as if something awful might happen: Several days  DALTON 7 Total Score: 12  If you checked any problems, how difficult have these problems made it for you to do your work, take care of things at home, or get along with other people: Somewhat difficult       09/10/24 1100   Screening for Adults With ADHD - (1-6)   1. How often do you have trouble wrapping up the final details of a project, once the challenging parts have been done? Often   2. How often do you have difficulty getting things in order when you have to do a task that requires organization? Very Often   3. How often do you have problems remembering appointments or obligations  Very Often   4. When you have a task that requires a lot of thought, how often do you avoid or delay getting started ? Often   5. How often do you fidget or squirm with your hands or feet when you have to sit down for a long time? Very Often   6. How often do you feel overly active and compelled to do things, like you were driven by a motor? Often   7.  "How often do you make careless mistakes when you have to work on a boring or difficult project? Sometimes   8. How often do have difficulty keeping your attention when you are doing boring or repetitive work? Very Often   9. How often do you have difficulty concentrating on what people say to you, even when they are speaking to you Often   10.How often do you misplace or have difficulty finding things at home or at work? Very Often   11.How often are you distracted by activity or noise around you? Often   12.How often do you leave your seat in meetings or other situations in which you are expected to remain seated? Sometimes   13.How often do you feel restless or fidgety? Very Often   14.How often do you have difficulty unwinding and relaxing when you have time to yourself? Very Often   15.How often do you find yourself talking too much when you are in social situations? Sometimes   16.When you’re in a conversation, how often do you find yourself finishing the sentences of the people you are talking to, before they can finish them themselves? Sometimes   17.How often do you have difficulty waiting your turn in situations when turn taking is required? Often   18.How often do you interrupt others when they are busy? Sometimes     Patient History:   The following portions of the patient's history were reviewed and updated as appropriate: allergies, current medications, past family history, past medical history, past social history, past surgical history and problem list.     Past Medical History:   Diagnosis Date    ACL (anterior cruciate ligament) tear     LEFT    Acne     ADHD     NO MEDICINE NEW DIAGNOSIS    Family history of von Willebrand disease     PT MOTHER STATED \"HIS DAD WAS PROBABLE BUT WAS NEVER CONFIRMED'.       Social History     Socioeconomic History    Marital status: Single   Tobacco Use    Smoking status: Former     Types: Cigarettes    Smokeless tobacco: Never    Tobacco comments:     SMOKES " OCCASIONALLY   Vaping Use    Vaping status: Former    Substances: Nicotine, THC, Flavoring    Devices: Disposable   Substance and Sexual Activity    Alcohol use: Yes    Drug use: Yes     Types: Marijuana     Comment: smokes about once a week    Sexual activity: Yes     Partners: Female     Medications:     Current Outpatient Medications:     atomoxetine (Strattera) 40 MG capsule, Take 1 capsule by mouth Daily for 60 days., Disp: 30 capsule, Rfl: 1    ciprofloxacin (CIPRO) 250 MG tablet, Take 1 tablet by mouth 2 (Two) Times a Day. (Patient not taking: Reported on 6/28/2024), Disp: 10 tablet, Rfl: 0    cyclobenzaprine (FLEXERIL) 10 MG tablet, 1 tablet. (Patient not taking: Reported on 6/28/2024), Disp: , Rfl:     diclofenac (VOLTAREN) 75 MG EC tablet, 1 tablet. (Patient not taking: Reported on 6/28/2024), Disp: , Rfl:     escitalopram (Lexapro) 20 MG tablet, Take 1 tablet by mouth Daily for 90 days., Disp: 30 tablet, Rfl: 2    Objective     Physical Exam:  Vital Signs: There were no vitals filed for this visit.  There is no height or weight on file to calculate BMI.     Mental Status Exam:   MENTAL STATUS EXAM   General Appearance:  Cleanly groomed and dressed  Eye Contact:  Good eye contact  Attitude:  Cooperative  Motor Activity:  Normal gait, posture  Muscle Strength:  Normal  Speech:  Normal rate, tone, volume  Language:  Spontaneous  Mood and affect:  Normal, pleasant  Hopelessness:  Denies  Loneliness: Denies  Thought Process:  Logical  Associations/ Thought Content:  No delusions  Hallucinations:  None  Suicidal Ideations:  Not present  Homicidal Ideation:  Not present  Sensorium:  Alert and clear  Orientation:  Person, place, time and situation  Attention Span/ Concentration:  Good  Fund of Knowledge:  Appropriate for age and educational level  Intellectual Functioning:  Average range  Insight:  Good  Judgement:  Good  Reliability:  Good  Impulse Control:  Good        @RESULASTCBCDIFFPANEL,TSH,LABLIPI,[id number],[id number],MG,FOLATE,PROLACTIN,CRPRESULT,CMP,[id number])@    Lab Results   Component Value Date    GLUCOSE 85 09/15/2023    BUN 15 09/15/2023    CREATININE 1.24 09/15/2023    EGFRRESULT 85 09/15/2023    BCR 12 09/15/2023    K 5.2 09/15/2023    CO2 28 09/15/2023    CALCIUM 10.1 09/15/2023    PROTENTOTREF 7.2 09/15/2023    ALBUMIN 5.1 09/15/2023    BILITOT 0.6 09/15/2023    AST 23 09/15/2023    ALT 16 09/15/2023       Lab Results   Component Value Date    WBC 5.5 09/15/2023    HGB 15.9 09/15/2023    HCT 47.1 09/15/2023    MCV 89 09/15/2023     09/15/2023       Lab Results   Component Value Date    CHLPL 173 09/15/2023    TRIG 46 09/15/2023    HDL 85 09/15/2023    LDL 78 09/15/2023                Assessment / Plan      Assessment:  Patient is a 24-year-old male with a history of depression and anxiety that started a few years ago. Pt was last seen on 8/9/24 and continued on Lexapro 20mg daily.  Patient reports to still feel anxious.  After interviewing patient more on how his ADHD symptoms affect his day-to-day life, I do believe that a lot of the patient's anxiety is attributed more to uncontrolled ADHD symptoms.  Did review patient's neuropsych testing from 2019 and scanned into chart.  Will start Strattera 40 mg daily to help with ADHD symptoms.  Will continue Lexapro 20 mg daily.  Will see patient in 1 month.    Diagnoses and all orders for this visit:    1. Attention deficit hyperactivity disorder (ADHD), predominantly inattentive type (Primary)    Other orders  -     atomoxetine (Strattera) 40 MG capsule; Take 1 capsule by mouth Daily for 60 days.  Dispense: 30 capsule; Refill: 1       Plan/patient education:   Start Strattera 40mg daily.   Continue Lexapro 20 mg daily.  Complete ADHD testing.  Discussed medication options and treatment plan of prescribed medication as well as the risks, benefits, and side effects   Encouraged pt to continue supportive  psychotherapy efforts and medications as indicated.  Educated pt on signs and symptoms of serotonin syndrome and notified pt to go to the ER if experiencing these symptoms.   Notified pt that antidepressants can sometimes cause worsening SI and to monitor for this.      Short-term goals: Continue to develop rapport with patient.     Long-term goals: See symptom reduction with medication and therapy.     Weakness: None noted     Strength: Great support from family.    Continue supportive psychotherapy efforts and medications as indicated. Treatment and medication options discussed during today's visit. Patient ackowledged and verbally consented to continue with current treatment plan and was educated on the importance of compliance with treatment and follow-up appointments. Patient seems reasonably able to adhere to treatment plan.      Medication Considerations:  Discussed medication options and treatment plan of prescribed medication(s) as well as the risks, benefits, and potential side effects. Patient is agreeable to call the office with any worsening of symptoms or onset of side effects. Patient is agreeable to call 911 or go to the nearest ER should he/she begin having SI/HI.    Quality Measures:   Encouraged pt to reduce his use of nicotine. Educated pt on risks of nicotine products.    Davie reviewed and is appropriate.    Follow Up:   Return in about 4 weeks (around 10/8/2024) for Recheck.    Copied text in this note has been reviewed and is accurate as of 9/10/2024.    Part of this note may be an electronic transcription/translation of spoken language to printed text using the Dragon Dictation System.    BRI Robert, PMP-BC

## 2024-10-19 DIAGNOSIS — F41.0 GENERALIZED ANXIETY DISORDER WITH PANIC ATTACKS: ICD-10-CM

## 2024-10-19 DIAGNOSIS — F41.1 GENERALIZED ANXIETY DISORDER WITH PANIC ATTACKS: ICD-10-CM

## 2024-10-21 RX ORDER — ESCITALOPRAM OXALATE 20 MG/1
20 TABLET ORAL DAILY
Qty: 30 TABLET | Refills: 2 | OUTPATIENT
Start: 2024-10-21 | End: 2025-01-19

## 2024-11-13 ENCOUNTER — TELEPHONE (OUTPATIENT)
Age: 24
End: 2024-11-13
Payer: COMMERCIAL

## 2024-11-13 NOTE — TELEPHONE ENCOUNTER
Pt lvm on MA line asking about denied refill. Called pt, lvm.    Refill request was from 10/21/2024. Pt has refills on file. PT NEEDS TO CONTACT PHARMACY FOR REFILLS.    MyChart msg sent    Pt also needs appt.

## 2024-11-15 ENCOUNTER — TELEMEDICINE (OUTPATIENT)
Age: 24
End: 2024-11-15
Payer: COMMERCIAL

## 2024-11-15 DIAGNOSIS — F41.0 GENERALIZED ANXIETY DISORDER WITH PANIC ATTACKS: ICD-10-CM

## 2024-11-15 DIAGNOSIS — F41.1 GENERALIZED ANXIETY DISORDER WITH PANIC ATTACKS: ICD-10-CM

## 2024-11-15 DIAGNOSIS — F90.0 ATTENTION DEFICIT HYPERACTIVITY DISORDER (ADHD), PREDOMINANTLY INATTENTIVE TYPE: Primary | ICD-10-CM

## 2024-11-15 RX ORDER — ATOMOXETINE 40 MG/1
40 CAPSULE ORAL DAILY
Qty: 30 CAPSULE | Refills: 1 | Status: SHIPPED | OUTPATIENT
Start: 2024-11-15 | End: 2025-01-14

## 2024-11-15 RX ORDER — ESCITALOPRAM OXALATE 20 MG/1
20 TABLET ORAL DAILY
Qty: 30 TABLET | Refills: 2 | Status: SHIPPED | OUTPATIENT
Start: 2024-11-15 | End: 2025-02-13

## 2024-11-15 NOTE — PROGRESS NOTES
"     Office  Follow Up Visit      Patient Name: Isaias Hardin  : 2000   MRN: [id number]     Referring Provider: Maliha Baer APRN    This provider is located at [facility], [address] [place] [address], using a secure [employer] Video Visit through [employer]. Patient is being seen remotely via telehealth at [address] [place] [address], and stated they are in a secure environment for this session. The patient's condition being diagnosed/treated is appropriate for telemedicine. The provider identified herself as well as her credentials. The patient, and/or patients guardian, consent to be seen remotely, and when consent is given they understand that the consent allows for patient identifiable information to be sent to a third party as needed.   They may refuse to be seen remotely at any time. The electronic data is encrypted and password protected, and the patient and/or guardian has been advised of the potential risks to privacy not withstanding such measures.     Mode of Visit: Video  Location of patient: -HOME-  Location of provider: +[facility]+  You have chosen to receive care through a telehealth visit.  The patient has signed the video visit consent form.  The visit included audio and video interaction. No technical issues occurred during this visit.    Chief Complaint:  \"my anxiety has improved\"    ICD-10-CM ICD-9-CM   1. Attention deficit hyperactivity disorder (ADHD), predominantly inattentive type  F90.0 314.00   2. Generalized anxiety disorder with panic attacks  F41.1 300.02    F41.0 300.01      History of Present Illness:   Isaais Hardin is a 24 y.o. male who is here today for follow up.  Patient has a history of anxiety and depression that is well managed with Lexapro 20 mg daily.  During patient's last visit on 9/10/2024 he was diagnosed with ADHD, predominantly inattentive type.  During patient's last appointment he described that a lot of " his anxiety is more attributed to his inattentive and hyperactive symptoms.  Patient was started on Strattera 40 mg daily and continued on Lexapro 20 mg daily.    Today patient reports improvement with his inattentive symptoms which has improved his anxiety.  States that his concentration has improved and is able to sustain attention better.  Patient does feel that there is some more improvement needed with his inattentive symptoms but states he is very pleased with the Strattera so far.  Reports to feel anxious some days but states it is very manageable.  States he is depressed some days but states it is not a feeling that last several days in a row.  States that things are going well at school and in his social life.  States that his grades are good.  Denies any side effects to medications.  Reports to sleep 7 to 8 hours each night.  States appetite is good.  Denies SI/HI/AVH.    Subjective      Review of Systems:   Review of Systems   Constitutional:  Negative for appetite change.   Respiratory:  Negative for chest tightness and shortness of breath.    Gastrointestinal:  Negative for abdominal pain, constipation, diarrhea, nausea and vomiting.   Genitourinary:  Negative for difficulty urinating.   Neurological:  Negative for headaches.   Psychiatric/Behavioral:  Negative for hallucinations and suicidal ideas.        PHQ-9 Depression Screening  Little interest or pleasure in doing things? Several days   Feeling down, depressed, or hopeless? Several days   PHQ-2 Total Score 2   Trouble falling or staying asleep, or sleeping too much? Several days   Feeling tired or having little energy? Not at all   Poor appetite or overeating? Not at all   Feeling bad about yourself - or that you are a failure or have let yourself or your family down? Several days   Trouble concentrating on things, such as reading the newspaper or watching television? Several days   Moving or speaking so slowly that other people could have  noticed? Or the opposite - being so fidgety or restless that you have been moving around a lot more than usual? Several days   Thoughts that you would be better off dead, or of hurting yourself in some way? Not at all   PHQ-9 Total Score 6   If you checked off any problems, how difficult have these problems made it for you to do your work, take care of things at home, or get along with other people? Somewhat difficult     DALTON-7      Over the last two weeks, how often have you been bothered by the following problems?  Feeling nervous, anxious or on edge: Several days  Not being able to stop or control worrying: Several days  Worrying too much about different things: More than half the days  Trouble Relaxing: More than half the days  Being so restless that it is hard to sit still: More than half the days  Becoming easily annoyed or irritable: Several days  Feeling afraid as if something awful might happen: Several days  DALTON 7 Total Score: 10  If you checked any problems, how difficult have these problems made it for you to do your work, take care of things at home, or get along with other people: Somewhat difficult    Patient History:   The following portions of the patient's history were reviewed and updated as appropriate: allergies, current medications, past family history, past medical history, past social history, past surgical history and problem list.     Social History     Socioeconomic History    Marital status: Single   Tobacco Use    Smoking status: Former     Types: Cigarettes    Smokeless tobacco: Never    Tobacco comments:     SMOKES OCCASIONALLY   Vaping Use    Vaping status: Former    Substances: Nicotine, THC, Flavoring    Devices: Disposable   Substance and Sexual Activity    Alcohol use: Yes    Drug use: Yes     Types: Marijuana     Comment: smokes about once a week    Sexual activity: Yes     Partners: Female     Medications:     Current Outpatient Medications:     escitalopram (Lexapro) 20 MG  tablet, Take 1 tablet by mouth Daily for 90 days., Disp: 30 tablet, Rfl: 2    atomoxetine (Strattera) 40 MG capsule, Take 1 capsule by mouth Daily for 60 days., Disp: 30 capsule, Rfl: 1    ciprofloxacin (CIPRO) 250 MG tablet, Take 1 tablet by mouth 2 (Two) Times a Day. (Patient not taking: Reported on 6/28/2024), Disp: 10 tablet, Rfl: 0    cyclobenzaprine (FLEXERIL) 10 MG tablet, 1 tablet. (Patient not taking: Reported on 6/28/2024), Disp: , Rfl:     diclofenac (VOLTAREN) 75 MG EC tablet, 1 tablet. (Patient not taking: Reported on 6/28/2024), Disp: , Rfl:     Objective     Physical Exam:  Vital Signs: There were no vitals filed for this visit.  There is no height or weight on file to calculate BMI.     Mental Status Exam:   MENTAL STATUS EXAM   General Appearance:  Cleanly groomed and dressed  Eye Contact:  Good eye contact  Attitude:  Cooperative  Motor Activity:  Normal gait, posture  Speech:  Normal rate, tone, volume  Language:  Spontaneous  Mood and affect:  Normal, pleasant  Hopelessness:  Denies  Loneliness: Denies  Thought Process:  Logical  Associations/ Thought Content:  No delusions  Hallucinations:  None  Suicidal Ideations:  Not present  Homicidal Ideation:  Not present  Sensorium:  Clear and alert  Orientation:  Person, place, situation and time  Attention Span/ Concentration:  Good  Fund of Knowledge:  Appropriate for age and educational level  Intellectual Functioning:  Average range  Insight:  Good  Judgement:  Good  Reliability:  Good  Impulse Control:  Good       @RESULASTCBCDIFFPANEL,TSH,LABLIPI,[id number],OPCYBYWL94,MG,FOLATE,PROLACTIN,CRPRESULT,CMP,[id number])@    Lab Results   Component Value Date    GLUCOSE 85 09/15/2023    BUN 15 09/15/2023    CREATININE 1.24 09/15/2023    EGFRRESULT 85 09/15/2023    BCR 12 09/15/2023    K 5.2 09/15/2023    CO2 28 09/15/2023    CALCIUM 10.1 09/15/2023    PROTENTOTREF 7.2 09/15/2023    ALBUMIN 5.1 09/15/2023    BILITOT 0.6 09/15/2023    AST 23 09/15/2023     ALT 16 09/15/2023       Lab Results   Component Value Date    WBC 5.5 09/15/2023    HGB 15.9 09/15/2023    HCT 47.1 09/15/2023    MCV 89 09/15/2023     09/15/2023       Lab Results   Component Value Date    CHLPL 173 09/15/2023    TRIG 46 09/15/2023    HDL 85 09/15/2023    LDL 78 09/15/2023              Assessment / Plan      Assessment:  Patient is a 24-year-old male with a history of anxiety and depression that is well managed with Lexapro 20 mg daily.  During patient's last visit on 9/10/2024 he was diagnosed with ADHD, predominantly inattentive type.  During patient's last appointment he described that a lot of his anxiety is more attributed to his inattentive and hyperactive symptoms.  Patient was started on Strattera 40 mg daily and continued on Lexapro 20 mg daily. Today patient reports improvement with his inattentive symptoms which has improved his anxiety.  Patient's PHQ-9 and DALTON-7 scores have improved since last visit.  Will have patient come in for a MA over his Thanksgiving break to check his blood pressure but if his blood pressure is okay will increase Strattera to 60 mg daily.  Will see patient in 6 weeks.  Notified patient to let us know if he ever needs therapy.  Will continue Lexapro 20 mg daily.  Patient states that he currently does not have time for therapy.    Diagnoses and all orders for this visit:    1. Attention deficit hyperactivity disorder (ADHD), predominantly inattentive type (Primary)    2. Generalized anxiety disorder with panic attacks  -     escitalopram (Lexapro) 20 MG tablet; Take 1 tablet by mouth Daily for 90 days.  Dispense: 30 tablet; Refill: 2    Other orders  -     atomoxetine (Strattera) 40 MG capsule; Take 1 capsule by mouth Daily for 60 days.  Dispense: 30 capsule; Refill: 1       Plan/patient education:   Continue Strattera 40 mg daily-will increase Strattera to 60 mg daily once patient comes in to have his blood pressure checked to further improve inattentive  symptoms.  Continue Lexapro 20 mg daily.  Discussed medication options and treatment plan of prescribed medication as well as the risks, benefits, and side effects   Encouraged pt to continue supportive psychotherapy efforts and medications as indicated.  Educated pt on signs and symptoms of serotonin syndrome and notified pt to go to the ER if experiencing these symptoms.   Notified pt that antidepressants can sometimes cause worsening SI and to monitor for this.      Short-term goals: Continue to develop rapport with patient.     Long-term goals: See symptom reduction with medication and therapy.     Weakness: None noted     Strength: Great support from family.    Continue supportive psychotherapy efforts and medications as indicated. Treatment and medication options discussed during today's visit. Patient ackowledged and verbally consented to continue with current treatment plan and was educated on the importance of compliance with treatment and follow-up appointments. Patient seems reasonably able to adhere to treatment plan.      Medication Considerations:  Discussed medication options and treatment plan of prescribed medication(s) as well as the risks, benefits, and potential side effects. Patient is agreeable to call the office with any worsening of symptoms or onset of side effects. Patient is agreeable to call 911 or go to the nearest ER should he/she begin having SI/HI.    Quality Measures:   Encouraged pt to reduce his use of nicotine. Educated pt on risks of nicotine products.     Davie reviewed and is appropriate.    Follow Up:   Return in about 6 weeks (around 12/27/2024) for Recheck.    Copied text in this note has been reviewed and is accurate as of 11/15/2024.    Part of this note may be an electronic transcription/translation of spoken language to printed text using the Dragon Dictation System.    BRI Robert, [facility] Hospital[facility]-BC

## 2024-11-25 ENCOUNTER — TELEPHONE (OUTPATIENT)
Age: 24
End: 2024-11-25
Payer: COMMERCIAL

## 2024-11-25 ENCOUNTER — CLINICAL SUPPORT (OUTPATIENT)
Age: 24
End: 2024-11-25
Payer: COMMERCIAL

## 2024-11-25 VITALS — DIASTOLIC BLOOD PRESSURE: 92 MMHG | HEART RATE: 73 BPM | SYSTOLIC BLOOD PRESSURE: 140 MMHG | OXYGEN SATURATION: 97 %

## 2024-11-25 NOTE — TELEPHONE ENCOUNTER
Osmin called because of construction near his home. He needed to ,move his MA appt up from 1 pm to 2 pm.

## 2024-12-27 ENCOUNTER — TELEMEDICINE (OUTPATIENT)
Age: 24
End: 2024-12-27
Payer: COMMERCIAL

## 2024-12-27 DIAGNOSIS — F41.0 GENERALIZED ANXIETY DISORDER WITH PANIC ATTACKS: Primary | ICD-10-CM

## 2024-12-27 DIAGNOSIS — F41.1 GENERALIZED ANXIETY DISORDER WITH PANIC ATTACKS: Primary | ICD-10-CM

## 2024-12-27 DIAGNOSIS — F90.0 ATTENTION DEFICIT HYPERACTIVITY DISORDER (ADHD), PREDOMINANTLY INATTENTIVE TYPE: ICD-10-CM

## 2024-12-27 NOTE — PROGRESS NOTES
Office  Follow Up Visit      Patient Name: Isaias Hardin  : 2000   MRN: [id number]     Referring Provider: Maliha Baer APRN    This provider is located at [facility], [address] [place] [address], using a secure [employer] Video Visit through [employer]. Patient is being seen remotely via telehealth in the parking lot of [employer] [facility] ([address] [place] [address]), and stated they are in a secure environment for this session. The patient's condition being diagnosed/treated is appropriate for telemedicine. The provider identified herself as well as her credentials. The patient, and/or patients guardian, consent to be seen remotely, and when consent is given they understand that the consent allows for patient identifiable information to be sent to a third party as needed.   They may refuse to be seen remotely at any time. The electronic data is encrypted and password protected, and the patient and/or guardian has been advised of the potential risks to privacy not withstanding such measures.      Mode of Visit: Video  Location of patient: -HOME-  Location of provider: +[facility]  You have chosen to receive care through a telehealth visit.  The patient has signed the video visit consent form.  The visit included audio and video interaction. No technical issues occurred during this visit.    Chief Complaint:      ICD-10-CM ICD-9-CM   1. Generalized anxiety disorder with panic attacks  F41.1 300.02    F41.0 300.01   2. Attention deficit hyperactivity disorder (ADHD), predominantly inattentive type  F90.0 314.00      History of Present Illness:   Isaias Hardin is a 24 y.o. male who is here today for follow up. Patient has a history of anxiety and depression that is well managed with Lexapro 20 mg daily. During patient's last visit on 9/10/2024 he was diagnosed with ADHD, predominantly inattentive type.  On 9/10/2024 he described that a lot of his anxiety  is more attributed to his inattentive and hyperactive symptoms. Patient was started on Strattera 40 mg daily and continued on Lexapro 20 mg daily.  During patient's last visit on 11/15/2024 patient was continued on Lexapro 20 mg daily and Strattera 40 mg daily.  Patient's blood pressure was taken in office and was elevated at 140/92.  Patient stated that his blood pressure is always really high at the doctor offices because it brings him a lot of anxiety to come in.  Encouraged patient to take his blood pressure at home and make a log to see if it continues to be elevated or if it is really just in the office that is elevated.    Today patient states that he forgot he was supposed to take his blood pressure every day for a week to see if his blood pressure is also elevated at home.  States that the Strattera has been very helpful with his inattentive and hyperactive symptoms but does think that there is a little bit more improvement that could be made.  Denies feeling depressed.  States that he is anxious about half that easily but states it is very manageable.  Patient states that he is mostly anxious about what the next couple of months are going to look like after he graduates college and has just anxious planning his future.  States that he is often very hard on himself and has great expectations for himself. Reports still beginning 6 7 hours each night.  Denies any side effects to his medications.  Denies SI/HI/AVH.  No change in appetite.    Subjective      Review of Systems:   Review of Systems   Constitutional:  Negative for appetite change.   Respiratory:  Negative for chest tightness and shortness of breath.    Gastrointestinal:  Negative for abdominal pain, constipation, diarrhea, nausea and vomiting.   Genitourinary:  Negative for difficulty urinating.   Neurological:  Negative for headaches.   Psychiatric/Behavioral:  Negative for hallucinations and suicidal ideas. The patient is nervous/anxious.       PHQ-9 Depression Screening  Little interest or pleasure in doing things? Several days   Feeling down, depressed, or hopeless? Not at all   PHQ-2 Total Score 1   Trouble falling or staying asleep, or sleeping too much? Over half   Feeling tired or having little energy? Not at all   Poor appetite or overeating? Not at all   Feeling bad about yourself - or that you are a failure or have let yourself or your family down? Over half   Trouble concentrating on things, such as reading the newspaper or watching television? Several days   Moving or speaking so slowly that other people could have noticed? Or the opposite - being so fidgety or restless that you have been moving around a lot more than usual? Over half   Thoughts that you would be better off dead, or of hurting yourself in some way? Not at all   PHQ-9 Total Score 8   If you checked off any problems, how difficult have these problems made it for you to do your work, take care of things at home, or get along with other people? Somewhat difficult     DALTON-7      Over the last two weeks, how often have you been bothered by the following problems?  Feeling nervous, anxious or on edge: More than half the days  Not being able to stop or control worrying: Several days  Worrying too much about different things: More than half the days  Trouble Relaxing: Several days  Being so restless that it is hard to sit still: More than half the days  Becoming easily annoyed or irritable: More than half the days  Feeling afraid as if something awful might happen: Several days  DALTON 7 Total Score: 11  If you checked any problems, how difficult have these problems made it for you to do your work, take care of things at home, or get along with other people: Somewhat difficult    Patient History:   The following portions of the patient's history were reviewed and updated as appropriate: allergies, current medications, past family history, past medical history, past social history, past  surgical history and problem list.     Social History     Socioeconomic History    Marital status: Single   Tobacco Use    Smoking status: Former     Types: Cigarettes    Smokeless tobacco: Never    Tobacco comments:     SMOKES OCCASIONALLY   Vaping Use    Vaping status: Former    Substances: Nicotine, THC, Flavoring    Devices: Disposable   Substance and Sexual Activity    Alcohol use: Yes    Drug use: Yes     Types: Marijuana     Comment: smokes about once a week    Sexual activity: Yes     Partners: Female       Medications:     Current Outpatient Medications:     atomoxetine (Strattera) 40 MG capsule, Take 1 capsule by mouth Daily for 60 days., Disp: 30 capsule, Rfl: 1    ciprofloxacin (CIPRO) 250 MG tablet, Take 1 tablet by mouth 2 (Two) Times a Day. (Patient not taking: Reported on 6/28/2024), Disp: 10 tablet, Rfl: 0    cyclobenzaprine (FLEXERIL) 10 MG tablet, 1 tablet. (Patient not taking: Reported on 6/28/2024), Disp: , Rfl:     diclofenac (VOLTAREN) 75 MG EC tablet, 1 tablet. (Patient not taking: Reported on 6/28/2024), Disp: , Rfl:     escitalopram (Lexapro) 20 MG tablet, Take 1 tablet by mouth Daily for 90 days., Disp: 30 tablet, Rfl: 2    Objective     Physical Exam:  Vital Signs: There were no vitals filed for this visit.  There is no height or weight on file to calculate BMI.     Mental Status Exam:   MENTAL STATUS EXAM   General Appearance:  Cleanly groomed and dressed  Eye Contact:  Good eye contact  Attitude:  Cooperative  Motor Activity:  Normal gait, posture  Speech:  Normal rate, tone, volume  Language:  Spontaneous  Mood and affect:  Normal, pleasant  Hopelessness:  Denies  Loneliness: Denies  Thought Process:  Logical  Associations/ Thought Content:  No delusions  Hallucinations:  None  Suicidal Ideations:  Not present  Homicidal Ideation:  Not present  Sensorium:  Alert and clear  Orientation:  Person, place, time and situation  Attention Span/ Concentration:  Good  Fund of Knowledge:   Appropriate for age and educational level  Intellectual Functioning:  Average range  Insight:  Good  Judgement:  Good  Reliability:  Good  Impulse Control:  Good       @RESULASTCBCDIFFPANEL,TSH,LABLIPI,[id number],FRJNQUHL44,MG,FOLATE,PROLACTIN,CRPRESULT,CMP,[id number])@    Lab Results   Component Value Date    GLUCOSE 85 09/15/2023    BUN 15 09/15/2023    CREATININE 1.24 09/15/2023    EGFRRESULT 85 09/15/2023    BCR 12 09/15/2023    K 5.2 09/15/2023    CO2 28 09/15/2023    CALCIUM 10.1 09/15/2023    PROTENTOTREF 7.2 09/15/2023    ALBUMIN 5.1 09/15/2023    BILITOT 0.6 09/15/2023    AST 23 09/15/2023    ALT 16 09/15/2023       Lab Results   Component Value Date    WBC 5.5 09/15/2023    HGB 15.9 09/15/2023    HCT 47.1 09/15/2023    MCV 89 09/15/2023     09/15/2023       Lab Results   Component Value Date    CHLPL 173 09/15/2023    TRIG 46 09/15/2023    HDL 85 09/15/2023    LDL 78 09/15/2023              Assessment / Plan      Assessment:  Pt is a 23 yo male with a history of ADHD, predominantly inattentive type and DALTON with panic attacks. Pt currently takes Lexapro 20mg daily and Strattera 40mg daily. During pt's last MA appointment BP was elevated. Patient stated that his blood pressure is always really high at the doctor offices because it brings him a lot of anxiety to come in.  Pt was encouraged patient to take his blood pressure at home and make a log to see if it continues to be elevated or if it is really just in the office that is elevated.  Today patient admits that he forgot that he was supposed to take his blood pressure at home.  Notified patient that he should try to take his blood pressure every day for a week and log it for me to see during her next visit.  Will see patient in 1 month.  Will continue Lexapro 20 mg daily and Strattera 40 mg daily for now.  Given that patient states that his inattentive symptoms are well managed with the Strattera at 40 mg but thinks he could get a little bit more  benefit from the medication, will increase Strattera to 60 mg daily if his blood pressure is okay at next visit.  Patient's anxiety is a little bit elevated today but it seems to be more situational and manageable.  Will continue current medication regimen.    Diagnoses and all orders for this visit:    1. Generalized anxiety disorder with panic attacks (Primary)    2. Attention deficit hyperactivity disorder (ADHD), predominantly inattentive type       Plan/patient education:   Continue Strattera 40 mg daily-will increase Strattera to 60 mg daily once patient comes in to have his blood pressure checked to further improve inattentive symptoms.  Continue Lexapro 20 mg daily.  Discussed medication options and treatment plan of prescribed medication as well as the risks, benefits, and side effects   Encouraged pt to continue supportive psychotherapy efforts and medications as indicated.  Educated pt on signs and symptoms of serotonin syndrome and notified pt to go to the ER if experiencing these symptoms.   Notified pt that antidepressants can sometimes cause worsening SI and to monitor for this.      Short-term goals: Continue to develop rapport with patient.     Long-term goals: See symptom reduction with medication and therapy.     Weakness: None noted     Strength: Great support from family.    Continue supportive psychotherapy efforts and medications as indicated. Treatment and medication options discussed during today's visit. Patient ackowledged and verbally consented to continue with current treatment plan and was educated on the importance of compliance with treatment and follow-up appointments. Patient seems reasonably able to adhere to treatment plan.      Medication Considerations:  Discussed medication options and treatment plan of prescribed medication(s) as well as the risks, benefits, and potential side effects. Patient is agreeable to call the office with any worsening of symptoms or onset of side  effects. Patient is agreeable to call 911 or go to the nearest ER should he/she begin having SI/HI.    Quality Measures:   Encouraged pt to reduce his use of nicotine. Educated pt on risks of nicotine products.     Davie reviewed and is appropriate.    Follow Up:   Return in about 4 weeks (around 1/24/2025) for Recheck.    Copied text in this note has been reviewed and is accurate as of 12/27/2024.    Part of this note may be an electronic transcription/translation of spoken language to printed text using the Dragon Dictation System.    BRI Rboert, PMHNP-BC

## 2025-01-08 ENCOUNTER — TELEPHONE (OUTPATIENT)
Age: 25
End: 2025-01-08
Payer: COMMERCIAL

## 2025-01-10 ENCOUNTER — TELEPHONE (OUTPATIENT)
Age: 25
End: 2025-01-10
Payer: COMMERCIAL

## 2025-01-15 ENCOUNTER — TELEPHONE (OUTPATIENT)
Age: 25
End: 2025-01-15
Payer: COMMERCIAL

## 2025-01-15 RX ORDER — ATOMOXETINE 40 MG/1
40 CAPSULE ORAL DAILY
Qty: 30 CAPSULE | Refills: 1 | Status: SHIPPED | OUTPATIENT
Start: 2025-01-15 | End: 2025-03-16

## 2025-01-15 NOTE — TELEPHONE ENCOUNTER
Patient left a VM stating he is currently out of his medications. He also stated he needs to schedule an appt with Bruna. A return call was made and a VM left for him to call to schedule.

## 2025-01-15 NOTE — TELEPHONE ENCOUNTER
Lvm for patient to call back and make appointment, we cannot refill medication until appointment is made.

## 2025-01-15 NOTE — TELEPHONE ENCOUNTER
Rx Refill Note  Requested Prescriptions     Pending Prescriptions Disp Refills    atomoxetine (Strattera) 40 MG capsule 30 capsule 1     Sig: Take 1 capsule by mouth Daily for 60 days.      Last office visit with prescribing clinician: 6/28/2024   Last telemedicine visit with prescribing clinician: 12/27/2024   Next office visit with prescribing clinician: 1/15/2025     Lory Velazquez  01/15/25, 11:28 EST

## 2025-01-16 ENCOUNTER — TELEPHONE (OUTPATIENT)
Dept: INTERNAL MEDICINE | Facility: CLINIC | Age: 25
End: 2025-01-16
Payer: COMMERCIAL

## 2025-01-17 ENCOUNTER — TELEMEDICINE (OUTPATIENT)
Age: 25
End: 2025-01-17
Payer: COMMERCIAL

## 2025-01-17 DIAGNOSIS — F41.0 GENERALIZED ANXIETY DISORDER WITH PANIC ATTACKS: Primary | ICD-10-CM

## 2025-01-17 DIAGNOSIS — F41.1 GENERALIZED ANXIETY DISORDER WITH PANIC ATTACKS: Primary | ICD-10-CM

## 2025-01-17 DIAGNOSIS — F90.0 ATTENTION DEFICIT HYPERACTIVITY DISORDER (ADHD), PREDOMINANTLY INATTENTIVE TYPE: ICD-10-CM

## 2025-01-17 RX ORDER — BUSPIRONE HYDROCHLORIDE 10 MG/1
10 TABLET ORAL 2 TIMES DAILY
Qty: 60 TABLET | Refills: 1 | Status: SHIPPED | OUTPATIENT
Start: 2025-01-17 | End: 2025-03-18

## 2025-01-17 NOTE — PROGRESS NOTES
"     Office  Follow Up Visit      Patient Name: Isaias Hardin  : 2000   MRN: [id number]     Referring Provider: Maliha Baer APRN    This provider is located at [facility], [address] [place] [address], [place], using a secure [employer] Video Visit through [facility]. Patient is being seen remotely via telehealth at their home address in [place], and stated they are in a secure environment for this session. The patient's condition being diagnosed/treated is appropriate for telemedicine. The provider identified herself as well as her credentials. The patient, and/or patients guardian, consent to be seen remotely, and when consent is given they understand that the consent allows for patient identifiable information to be sent to a third party as needed.   They may refuse to be seen remotely at any time. The electronic data is encrypted and password protected, and the patient and/or guardian has been advised of the potential risks to privacy not withstanding such measures.    Mode of Visit: Video  Location of patient: -HOME-  Location of provider: +[facility]+  You have chosen to receive care through a telehealth visit.  The patient has signed the video visit consent form.  The visit included audio and video interaction. No technical issues occurred during this visit.     Chief Complaint:  \"anxiety\"     ICD-10-CM ICD-9-CM   1. Generalized anxiety disorder with panic attacks  F41.1 300.02    F41.0 300.01   2. Attention deficit hyperactivity disorder (ADHD), predominantly inattentive type  F90.0 314.00      History of Present Illness:   Isaias Hardin is a 24 y.o. male who is here today for follow up. Patient has a history of anxiety and depression that is well managed with Lexapro 20 mg daily. During patient's last visit on 9/10/2024 he was diagnosed with ADHD, predominantly inattentive type.  On 9/10/2024 he described that a lot of his anxiety is more attributed to his " inattentive and hyperactive symptoms. Patient was started on Strattera 40 mg daily and continued on Lexapro 20 mg daily.  During patient's last visit on 11/15/2024 patient was continued on Lexapro 20 mg daily and Strattera 40 mg daily.  Patient's blood pressure was taken in office and was elevated at 140/92.  Patient stated that his blood pressure is always really high at the doctor offices because it brings him a lot of anxiety to come in.  Encouraged patient to take his blood pressure at home and make a log to see if it continues to be elevated or if it is really just in the office that is elevated.  Patient was last seen on 12/27/2024 and had not made a log of his blood pressures.  Encouraged patient to take his blood pressure for 1 week to see if we can increase his Strattera.  Patient was continued on Lexapro 20 mg daily.    Today patient is taking Strattera 40 mg daily and Lexapro 20 mg daily. Pt's primary complaint today is anxiety. Pt is unsure exactly why his anxiety has increased. 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 Reports that he has had difficulty controlling his anxiety recently. Pt did stop smoking which he believes may also be contributing to his anxiety. Pt is interested in trying out therapy again with a different therapist. Reports to feel down at times but the feeling does not last several days in a row. Denies any side effects to his medication. Pt actually feels that his ADHD symptoms are well managed with the current strattera dose. States he is now able to read a book and has gotten back into reading. Pt has been taking his BP at home and states it stays around 125/80ish consistently. Reports to be sleeping 7 hours each night. Denies SI/HI/AVH.     Subjective      Review of Systems:   Review of Systems   Constitutional:  Negative for appetite change.   Respiratory:  Negative for chest tightness and shortness of breath.     Gastrointestinal:  Negative for abdominal pain, constipation, diarrhea, nausea and vomiting.   Genitourinary:  Negative for difficulty urinating.   Neurological:  Negative for headaches.   Psychiatric/Behavioral:  Negative for decreased concentration, hallucinations, sleep disturbance and suicidal ideas. The patient is nervous/anxious.      PHQ-9 Depression Screening  Little interest or pleasure in doing things? Several days   Feeling down, depressed, or hopeless? Several days   PHQ-2 Total Score 2   Trouble falling or staying asleep, or sleeping too much? Over half   Feeling tired or having little energy? Not at all   Poor appetite or overeating? Not at all   Feeling bad about yourself - or that you are a failure or have let yourself or your family down? Several days   Trouble concentrating on things, such as reading the newspaper or watching television? Over half   Moving or speaking so slowly that other people could have noticed? Or the opposite - being so fidgety or restless that you have been moving around a lot more than usual? Almost all   Thoughts that you would be better off dead, or of hurting yourself in some way? Not at all   PHQ-9 Total Score 10   If you checked off any problems, how difficult have these problems made it for you to do your work, take care of things at home, or get along with other people? Somewhat difficult     DALTON-7      Over the last two weeks, how often have you been bothered by the following problems?  Feeling nervous, anxious or on edge: Nearly every day  Not being able to stop or control worrying: Nearly every day  Worrying too much about different things: More than half the days  Trouble Relaxing: Nearly every day  Being so restless that it is hard to sit still: Nearly every day  Becoming easily annoyed or irritable: Nearly every day  Feeling afraid as if something awful might happen: Several days  DALTON 7 Total Score: 18  If you checked any problems, how difficult have these  problems made it for you to do your work, take care of things at home, or get along with other people: Very difficult    Patient History:   The following portions of the patient's history were reviewed and updated as appropriate: allergies, current medications, past family history, past medical history, past social history, past surgical history and problem list.     Social History     Socioeconomic History    Marital status: Single   Tobacco Use    Smoking status: Former     Types: Cigarettes    Smokeless tobacco: Never    Tobacco comments:     SMOKES OCCASIONALLY   Vaping Use    Vaping status: Former    Substances: Nicotine, THC, Flavoring    Devices: Disposable   Substance and Sexual Activity    Alcohol use: Yes    Drug use: Yes     Types: Marijuana     Comment: smokes about once a week    Sexual activity: Yes     Partners: Female       Medications:     Current Outpatient Medications:     atomoxetine (Strattera) 40 MG capsule, Take 1 capsule by mouth Daily for 60 days., Disp: 30 capsule, Rfl: 1    busPIRone (BUSPAR) 10 MG tablet, Take 1 tablet by mouth 2 (Two) Times a Day for 60 days., Disp: 60 tablet, Rfl: 1    ciprofloxacin (CIPRO) 250 MG tablet, Take 1 tablet by mouth 2 (Two) Times a Day. (Patient not taking: Reported on 6/28/2024), Disp: 10 tablet, Rfl: 0    cyclobenzaprine (FLEXERIL) 10 MG tablet, 1 tablet. (Patient not taking: Reported on 6/28/2024), Disp: , Rfl:     diclofenac (VOLTAREN) 75 MG EC tablet, 1 tablet. (Patient not taking: Reported on 6/28/2024), Disp: , Rfl:     escitalopram (Lexapro) 20 MG tablet, Take 1 tablet by mouth Daily for 90 days., Disp: 30 tablet, Rfl: 2    Objective     Physical Exam:  Vital Signs: There were no vitals filed for this visit.  There is no height or weight on file to calculate BMI.     Mental Status Exam:   MENTAL STATUS EXAM   General Appearance:  Cleanly groomed and dressed  Eye Contact:  Good eye contact  Attitude:  Cooperative  Motor Activity:  Normal gait,  posture  Speech:  Normal rate, tone, volume  Language:  Spontaneous  Mood and affect:  Anxious  Hopelessness:  Denies  Loneliness: Denies  Thought Process:  Logical  Associations/ Thought Content:  No delusions  Hallucinations:  None  Suicidal Ideations:  Not present  Homicidal Ideation:  Not present  Sensorium:  Alert and clear  Orientation:  Person, place, time and situation  Attention Span/ Concentration:  Good  Fund of Knowledge:  Appropriate for age and educational level  Intellectual Functioning:  Average range  Insight:  Good  Judgement:  Good  Reliability:  Good  Impulse Control:  Good       @RESULASTCBCDIFFPANEL,TSH,LABLIPI,QVNRUTSP52,AORYBLKL11,MG,FOLATE,PROLACTIN,CRPRESULT,CMP,T8ACOWCLTMK)@    Lab Results   Component Value Date    GLUCOSE 85 09/15/2023    BUN 15 09/15/2023    CREATININE 1.24 09/15/2023    EGFRRESULT 85 09/15/2023    BCR 12 09/15/2023    K 5.2 09/15/2023    CO2 28 09/15/2023    CALCIUM 10.1 09/15/2023    PROTENTOTREF 7.2 09/15/2023    ALBUMIN 5.1 09/15/2023    BILITOT 0.6 09/15/2023    AST 23 09/15/2023    ALT 16 09/15/2023       Lab Results   Component Value Date    WBC 5.5 09/15/2023    HGB 15.9 09/15/2023    HCT 47.1 09/15/2023    MCV 89 09/15/2023     09/15/2023       Lab Results   Component Value Date    CHLPL 173 09/15/2023    TRIG 46 09/15/2023    HDL 85 09/15/2023    LDL 78 09/15/2023                  Assessment / Plan      Assessment:   Pt is a 23 yo male with a history of depression, DALTON, and ADHD, predominantly inattentive type. Pt is currently taking Lexapro 20mg daily and Strattera 40mg daily. Pt was last seen on 12/27/24 and was doing well with his anxiety and depression. Advised that pt take his BP for a few days at home as it was elevated in clinic to see if we could increase the strattera to 60mg to further improve his ADHD symptoms.  Today patient reports worsening anxiety.  Patient is unsure of the source of anxiety but believes it may be related to starting a new  internship and the fact that he recently stopped smoking.  Encouraged patient to start therapy again with a different therapist as he did not connect with the last therapist.  Will continue Lexapro 20 mg daily and Strattera 40 mg daily.  Patient feels that the Strattera at 40 mg daily has been fine in treating his ADHD symptoms.  Will add BuSpar 10 mg twice daily to help with patient anxiety.  Will see patient in 6 weeks.    Diagnoses and all orders for this visit:    1. Generalized anxiety disorder with panic attacks (Primary)  -     Ambulatory Referral to Psychology    2. Attention deficit hyperactivity disorder (ADHD), predominantly inattentive type    Other orders  -     busPIRone (BUSPAR) 10 MG tablet; Take 1 tablet by mouth 2 (Two) Times a Day for 60 days.  Dispense: 60 tablet; Refill: 1       Plan/patient education:   Add Buspar 10mg BID to help with anxiety.   Continue Strattera 40 mg daily and Lexapro 20 mg daily.  Discussed medication options and treatment plan of prescribed medication as well as the risks, benefits, and side effects   Encouraged pt to continue supportive psychotherapy efforts and medications as indicated.  Educated pt on signs and symptoms of serotonin syndrome and notified pt to go to the ER if experiencing these symptoms.   Notified pt that antidepressants can sometimes cause worsening SI and to monitor for this.      Short-term goals: Continue to develop rapport with patient.     Long-term goals: See symptom reduction with medication and therapy.     Weakness: None noted     Strength: Great support from family.    Continue supportive psychotherapy efforts and medications as indicated. Treatment and medication options discussed during today's visit. Patient ackowledged and verbally consented to continue with current treatment plan and was educated on the importance of compliance with treatment and follow-up appointments. Patient seems reasonably able to adhere to treatment plan.       Medication Considerations:  Discussed medication options and treatment plan of prescribed medication(s) as well as the risks, benefits, and potential side effects. Patient is agreeable to call the office with any worsening of symptoms or onset of side effects. Patient is agreeable to call 911 or go to the nearest ER should he/she begin having SI/HI.    Quality Measures:   Pt does not use tobacco products.     Davie reviewed and is appropriate.    Follow Up:   Return in about 6 weeks (around 2/28/2025) for Recheck.    Copied text in this note has been reviewed and is accurate as of 1/17/2025.    Part of this note may be an electronic transcription/translation of spoken language to printed text using the Dragon Dictation System.    BRI Robert, PMHNP-BC

## 2025-01-25 NOTE — PATIENT INSTRUCTIONS
Patient is aware to stop at the pharmacy and  his prescriptions and take as directed.  Patient will take the hydroxyzine as needed only up to 2 times a day every 8 hours.  Patient is aware this medication can cause drowsiness and only take as needed at bedtime and not with any other substances such as alcohol.  We will follow-up with patient in 6 weeks for his annual physical exam and recheck on anxiety.  Patient agrees with this treatment at this time.   none

## 2025-02-07 DIAGNOSIS — F41.1 GENERALIZED ANXIETY DISORDER WITH PANIC ATTACKS: ICD-10-CM

## 2025-02-07 DIAGNOSIS — F41.0 GENERALIZED ANXIETY DISORDER WITH PANIC ATTACKS: ICD-10-CM

## 2025-02-07 RX ORDER — ESCITALOPRAM OXALATE 20 MG/1
20 TABLET ORAL DAILY
Qty: 90 TABLET | Refills: 0 | Status: SHIPPED | OUTPATIENT
Start: 2025-02-07 | End: 2025-05-08

## 2025-02-07 NOTE — TELEPHONE ENCOUNTER
Rx Refill Note  Requested Prescriptions     Pending Prescriptions Disp Refills    escitalopram (Lexapro) 20 MG tablet 30 tablet 2     Sig: Take 1 tablet by mouth Daily for 90 days.      Last office visit with prescribing clinician: 6/28/2024   Last telemedicine visit with prescribing clinician: 1/17/2025   Next office visit with prescribing clinician: 2/28/2025     Lory Velazquez  02/07/25, 14:12 EST

## 2025-03-14 ENCOUNTER — TELEPHONE (OUTPATIENT)
Age: 25
End: 2025-03-14

## 2025-03-14 ENCOUNTER — TELEMEDICINE (OUTPATIENT)
Age: 25
End: 2025-03-14
Payer: COMMERCIAL

## 2025-03-14 DIAGNOSIS — F41.1 GENERALIZED ANXIETY DISORDER WITH PANIC ATTACKS: ICD-10-CM

## 2025-03-14 DIAGNOSIS — F41.0 GENERALIZED ANXIETY DISORDER WITH PANIC ATTACKS: ICD-10-CM

## 2025-03-14 DIAGNOSIS — F90.0 ATTENTION DEFICIT HYPERACTIVITY DISORDER (ADHD), PREDOMINANTLY INATTENTIVE TYPE: Primary | ICD-10-CM

## 2025-03-14 RX ORDER — BUSPIRONE HYDROCHLORIDE 10 MG/1
10 TABLET ORAL 2 TIMES DAILY
Qty: 180 TABLET | Refills: 0 | Status: SHIPPED | OUTPATIENT
Start: 2025-03-14 | End: 2025-06-12

## 2025-03-14 RX ORDER — ATOMOXETINE 40 MG/1
40 CAPSULE ORAL DAILY
Qty: 90 CAPSULE | Refills: 0 | Status: SHIPPED | OUTPATIENT
Start: 2025-03-14 | End: 2025-06-12

## 2025-03-14 RX ORDER — ESCITALOPRAM OXALATE 20 MG/1
20 TABLET ORAL DAILY
Qty: 90 TABLET | Refills: 0 | Status: SHIPPED | OUTPATIENT
Start: 2025-03-14 | End: 2025-06-12

## 2025-03-14 NOTE — PROGRESS NOTES
"     Office  Follow Up Visit      Patient Name: Isaias Hardin  : 2000   MRN: [id number]     Referring Provider: Maliha Baer APRN    This provider is located at [facility], [address] [place] [address], [facility] [address], [place], using a secure [employer] Video Visit through [employer]. Patient is being seen remotely via telehealth at their home address in [place], and stated they are in a secure environment for this session. The patient's condition being diagnosed/treated is appropriate for telemedicine. The provider identified herself as well as her credentials. The patient, and/or patients guardian, consent to be seen remotely, and when consent is given they understand that the consent allows for patient identifiable information to be sent to a third party as needed.   They may refuse to be seen remotely at any time. The electronic data is encrypted and password protected, and the patient and/or guardian has been advised of the potential risks to privacy not withstanding such measures.    Mode of Visit: Video  Location of patient: -HOME-  Location of provider: +[facility]+  You have chosen to receive care through a telehealth visit.  The patient has signed the video visit consent form.  The visit included audio and video interaction. No technical issues occurred during this visit.    Chief Complaint:  \"my anxiety has gotten better\"     ICD-10-CM ICD-9-CM   1. Attention deficit hyperactivity disorder (ADHD), predominantly inattentive type  F90.0 314.00   2. Generalized anxiety disorder with panic attacks  F41.1 300.02    F41.0 300.01      History of Present Illness:   Isaias Hardin is a 24 y.o. male who is here today for follow up. Patient has a history of anxiety and depression that is well managed with Lexapro 20 mg daily.   9/10/2024: Pt was diagnosed with ADHD, predominantly inattentive type.  Pt described that a lot of his anxiety is more attributed to his inattentive and hyperactive " "symptoms. Patient was started on Strattera 40 mg daily and continued on Lexapro 20 mg daily.    11/15/2024: patient was continued on Lexapro 20 mg daily and Strattera 40 mg daily.  Patient's blood pressure was taken in office and was elevated at 140/92.  Patient stated that his blood pressure is always really high at the doctor offices because it brings him a lot of anxiety to come in.  Encouraged patient to take his blood pressure at home and make a log to see if it continues to be elevated or if it is really just in the office that is elevated.    12/27/2024: Pt had not made a log of his blood pressures.  Encouraged patient to take his blood pressure for 1 week to see if we can increase his Strattera.  Patient was continued on Lexapro 20 mg daily.   1/17/25:  Today patient reports worsening anxiety.  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  Encouraged patient to start therapy again with a different therapist as he did not connect with the last therapist.  Will continue Lexapro 20 mg daily and Strattera 40 mg daily.  Patient feels that the Strattera at 40 mg daily has been fine in treating his ADHD symptoms.  Will add BuSpar 10 mg twice daily to help with patient anxiety.  Will see patient in 6 weeks.     Today pt reports to be doing better. Reports improvement in his anxiety with the added buspar. States the strattera has been very helpful for his ADHD symptoms. States \"I have actually been able to read a book and I haven't been able to do that in awhile\". Reports the his mood is low at times but is usually a happy person. Reports to be sleeping 7-8 hours each night. Denies any side effects to his medication. No change in appetite. Denies SI/HI/AVH. School and internship are going well.      Subjective      Review of Systems:   Review of Systems   Constitutional:  Negative for appetite change. "   Respiratory:  Negative for chest tightness and shortness of breath.    Gastrointestinal:  Negative for abdominal pain, constipation, diarrhea, nausea and vomiting.   Genitourinary:  Negative for difficulty urinating.   Neurological:  Negative for headaches.   Psychiatric/Behavioral:  Negative for hallucinations and suicidal ideas.      PHQ-9 Depression Screening  Little interest or pleasure in doing things? Several days   Feeling down, depressed, or hopeless? Several days   PHQ-2 Total Score 2   Trouble falling or staying asleep, or sleeping too much? More than half the days   Feeling tired or having little energy? Not at all   Poor appetite or overeating? Not at all   Feeling bad about yourself - or that you are a failure or have let yourself or your family down? More than half the days   Trouble concentrating on things, such as reading the newspaper or watching television? Several days   Moving or speaking so slowly that other people could have noticed? Or the opposite - being so fidgety or restless that you have been moving around a lot more than usual? More than half the days     Thoughts that you would be better off dead, or of hurting yourself in some way? Not at all   PHQ-9 Total Score 9   If you checked off any problems, how difficult have these problems made it for you to do your work, take care of things at home, or get along with other people? Somewhat difficult       DALTON-7      Over the last two weeks, how often have you been bothered by the following problems?  Feeling nervous, anxious or on edge: Several days  Not being able to stop or control worrying: More than half the days  Worrying too much about different things: More than half the days  Trouble Relaxing: More than half the days  Being so restless that it is hard to sit still: More than half the days  Becoming easily annoyed or irritable: Several days  Feeling afraid as if something awful might happen: More than half the days  DALTON 7 Total  Score: 12  If you checked any problems, how difficult have these problems made it for you to do your work, take care of things at home, or get along with other people: Somewhat difficult    Patient History:   The following portions of the patient's history were reviewed and updated as appropriate: allergies, current medications, past family history, past medical history, past social history, past surgical history and problem list.     Social History     Socioeconomic History    Marital status: Single   Tobacco Use    Smoking status: Former     Types: Cigarettes    Smokeless tobacco: Never    Tobacco comments:     SMOKES OCCASIONALLY   Vaping Use    Vaping status: Former    Substances: Nicotine, THC, Flavoring    Devices: Disposable   Substance and Sexual Activity    Alcohol use: Yes    Drug use: Yes     Types: Marijuana     Comment: smokes about once a week    Sexual activity: Yes     Partners: Female     Medications:     Current Outpatient Medications:     atomoxetine (Strattera) 40 MG capsule, Take 1 capsule by mouth Daily for 90 days., Disp: 90 capsule, Rfl: 0    busPIRone (BUSPAR) 10 MG tablet, Take 1 tablet by mouth 2 (Two) Times a Day for 90 days., Disp: 180 tablet, Rfl: 0    escitalopram (Lexapro) 20 MG tablet, Take 1 tablet by mouth Daily for 90 days., Disp: 90 tablet, Rfl: 0    ciprofloxacin (CIPRO) 250 MG tablet, Take 1 tablet by mouth 2 (Two) Times a Day. (Patient not taking: Reported on 6/28/2024), Disp: 10 tablet, Rfl: 0    cyclobenzaprine (FLEXERIL) 10 MG tablet, 1 tablet. (Patient not taking: Reported on 6/28/2024), Disp: , Rfl:     diclofenac (VOLTAREN) 75 MG EC tablet, 1 tablet. (Patient not taking: Reported on 6/28/2024), Disp: , Rfl:     mupirocin (BACTROBAN) 2 % ointment, Apply 1 Application topically to the appropriate area as directed 3 (Three) Times a Day., Disp: 22 g, Rfl: 0    Objective     Physical Exam:  Vital Signs: There were no vitals filed for this visit.  There is no height or weight  on file to calculate BMI.     Mental Status Exam:   MENTAL STATUS EXAM   General Appearance:  Cleanly groomed and dressed  Eye Contact:  Good eye contact  Attitude:  Cooperative  Motor Activity:  Normal gait, posture  Speech:  Normal rate, tone, volume  Language:  Spontaneous  Mood and affect:  Normal, pleasant  Hopelessness:  Denies  Loneliness: Denies  Thought Process:  Logical  Associations/ Thought Content:  No delusions  Hallucinations:  None  Suicidal Ideations:  Not present  Homicidal Ideation:  Not present  Sensorium:  Alert and clear  Orientation:  Person, place, time and situation  Attention Span/ Concentration:  Good  Fund of Knowledge:  Appropriate for age and educational level  Intellectual Functioning:  Average range  Insight:  Good  Judgement:  Good  Reliability:  Good  Impulse Control:  Good       @RESULASTCBCDIFFPANEL,TSH,LABLIPI,DULJPHTZ50,JCONRZRW74,MG,FOLATE,PROLACTIN,CRPRESULT,CMP,[id number])@    Lab Results   Component Value Date    GLUCOSE 85 09/15/2023    BUN 15 09/15/2023    CREATININE 1.24 09/15/2023    EGFR 85 09/15/2023    BCR 12 09/15/2023    K 5.2 09/15/2023    CO2 28 09/15/2023    CALCIUM 10.1 09/15/2023    ALBUMIN 5.1 09/15/2023    BILITOT 0.6 09/15/2023    AST 23 09/15/2023    ALT 16 09/15/2023       Lab Results   Component Value Date    WBC 5.5 09/15/2023    HGB 15.9 09/15/2023    HCT 47.1 09/15/2023    MCV 89 09/15/2023     09/15/2023       Lab Results   Component Value Date    CHLPL 173 09/15/2023    TRIG 46 09/15/2023    HDL 85 09/15/2023    LDL 78 09/15/2023                  Assessment / Plan      Assessment:  Patient is a 24-year-old male with a history of ADHD and DALTON with panic attacks.  Patient was last seen in office on 1/17/2025 when he reported some increased anxiety.  BuSpar 10 mg twice daily was added to help with anxiety symptoms and patient was continued on Lexapro 20 mg daily and Strattera 40 mg daily.  Today patient reports improvement in anxiety with the  added BuSpar.  Patient's DALTON-7 scores have significantly decreased since last visit.  Patient's anxiety, depressive symptoms, and ADHD symptoms seem to be well-managed with current medication regimen.  Given that patient is doing well on medication regimen will continue Lexapro 20 mg daily, Strattera 40 mg daily, and BuSpar 10 mg twice daily.  Encouraged patient to continue with therapy.  Advised that patient continue to take his blood pressure at home and to notify provider if systolic blood pressure is ever above 139 and/or diastolic blood pressure is ever above 89.  Patient's blood pressures are always elevated in office as he states he always gets nervous before getting his blood pressure taken but blood pressure remains normal at home.  Will see patient in 3 months patient knows he can reach out sooner if needed.    Diagnoses and all orders for this visit:    1. Attention deficit hyperactivity disorder (ADHD), predominantly inattentive type (Primary)    2. Generalized anxiety disorder with panic attacks  -     escitalopram (Lexapro) 20 MG tablet; Take 1 tablet by mouth Daily for 90 days.  Dispense: 90 tablet; Refill: 0    Other orders  -     atomoxetine (Strattera) 40 MG capsule; Take 1 capsule by mouth Daily for 90 days.  Dispense: 90 capsule; Refill: 0  -     busPIRone (BUSPAR) 10 MG tablet; Take 1 tablet by mouth 2 (Two) Times a Day for 90 days.  Dispense: 180 tablet; Refill: 0       Plan/patient education:   Continue Strattera 40 mg daily, BuSpar 10 mg twice daily, and Lexapro 20 mg daily.  Continue with therapy.  Advised that patient continue to take his blood pressure at home and to notify provider if systolic blood pressure is ever above 139 and/or diastolic blood pressure is ever above 89.  Patient's blood pressures are always elevated in office as he states he always gets nervous before getting his blood pressure taken but blood pressure remains normal at home.   Discussed medication options and  treatment plan of prescribed medication as well as the risks, benefits, and side effects   Encouraged pt to continue supportive psychotherapy efforts and medications as indicated.  Educated pt on signs and symptoms of serotonin syndrome and notified pt to go to the ER if experiencing these symptoms.   Notified pt that antidepressants can sometimes cause worsening SI and to monitor for this.      Short-term goals: Continue to develop rapport with patient.     Long-term goals: See symptom reduction with medication and therapy.     Weakness: None noted     Strength: Great support from family.    Continue supportive psychotherapy efforts and medications as indicated. Treatment and medication options discussed during today's visit. Patient ackowledged and verbally consented to continue with current treatment plan and was educated on the importance of compliance with treatment and follow-up appointments. Patient seems reasonably able to adhere to treatment plan.      Medication Considerations:  Discussed medication options and treatment plan of prescribed medication(s) as well as the risks, benefits, and potential side effects. Patient is agreeable to call the office with any worsening of symptoms or onset of side effects. Patient is agreeable to call 911 or go to the nearest ER should he/she begin having SI/HI.    Quality Measures:   Patient no longer uses tobacco products.    Davie reviewed and is appropriate.    Follow Up:   Return in about 3 months (around 6/14/2025) for Recheck.    Copied text in this note has been reviewed and is accurate as of 3/14/2025.    Part of this note may be an electronic transcription/translation of spoken language to printed text using the Dragon Dictation System.    BRI Robert, PMP-BC

## 2025-04-24 ENCOUNTER — OFFICE VISIT (OUTPATIENT)
Age: 25
End: 2025-04-24
Payer: COMMERCIAL

## 2025-04-24 VITALS
WEIGHT: 192.2 LBS | OXYGEN SATURATION: 99 % | HEART RATE: 92 BPM | SYSTOLIC BLOOD PRESSURE: 144 MMHG | BODY MASS INDEX: 24.67 KG/M2 | DIASTOLIC BLOOD PRESSURE: 82 MMHG | HEIGHT: 74 IN

## 2025-04-24 DIAGNOSIS — F90.0 ATTENTION DEFICIT HYPERACTIVITY DISORDER (ADHD), PREDOMINANTLY INATTENTIVE TYPE: ICD-10-CM

## 2025-04-24 DIAGNOSIS — F41.1 GENERALIZED ANXIETY DISORDER WITH PANIC ATTACKS: Primary | ICD-10-CM

## 2025-04-24 DIAGNOSIS — F41.0 GENERALIZED ANXIETY DISORDER WITH PANIC ATTACKS: Primary | ICD-10-CM

## 2025-04-24 RX ORDER — ATOMOXETINE 40 MG/1
40 CAPSULE ORAL DAILY
Qty: 90 CAPSULE | Refills: 0 | Status: SHIPPED | OUTPATIENT
Start: 2025-04-24 | End: 2025-07-23

## 2025-04-24 RX ORDER — ESCITALOPRAM OXALATE 20 MG/1
20 TABLET ORAL DAILY
Qty: 90 TABLET | Refills: 0 | Status: SHIPPED | OUTPATIENT
Start: 2025-04-24 | End: 2025-07-23

## 2025-04-24 RX ORDER — BUSPIRONE HYDROCHLORIDE 10 MG/1
10 TABLET ORAL 2 TIMES DAILY
Qty: 180 TABLET | Refills: 0 | Status: SHIPPED | OUTPATIENT
Start: 2025-04-24 | End: 2025-07-23

## 2025-04-24 NOTE — PROGRESS NOTES
"     Office  Follow Up Visit      Patient Name: Isaias Hardin  : 2000   MRN: [id number]     Referring Provider: Maliha Baer APRN    Chief Complaint:  \"I have been doing good\"    ICD-10-CM ICD-9-CM   1. Generalized anxiety disorder with panic attacks  F41.1 300.02    F41.0 300.01   2. Attention deficit hyperactivity disorder (ADHD), predominantly inattentive type  F90.0 314.00      History of Present Illness:   Isaias Hardin is a 24 y.o. male who is here today for follow up. Patient has a history of anxiety and depression that is well managed with Lexapro 20 mg daily.   9/10/2024: Pt was diagnosed with ADHD, predominantly inattentive type.  Pt described that a lot of his anxiety is more attributed to his inattentive and hyperactive symptoms. Patient was started on Strattera 40 mg daily and continued on Lexapro 20 mg daily.    11/15/2024: patient was continued on Lexapro 20 mg daily and Strattera 40 mg daily.  Patient's blood pressure was taken in office and was elevated at 140/92.  Patient stated that his blood pressure is always really high at the doctor offices because it brings him a lot of anxiety to come in.  Encouraged patient to take his blood pressure at home and make a log to see if it continues to be elevated or if it is really just in the office that is elevated.    2024: Pt had not made a log of his blood pressures.  Encouraged patient to take his blood pressure for 1 week to see if we can increase his Strattera.  Patient was continued on Lexapro 20 mg daily.   25:  Today patient reports worsening anxiety.  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  Encouraged patient to start therapy again with a different therapist as he did not connect with the last therapist.  Will continue Lexapro 20 mg daily and Strattera 40 mg daily.  Patient feels that the Strattera at 40 mg daily " has been fine in treating his ADHD symptoms.  Will add BuSpar 10 mg twice daily to help with patient anxiety.  Will see patient in 6 weeks.   3/14/25: Today patient reports improvement in anxiety with the added BuSpar. Patient's DALTON-7 scores have significantly decreased since last visit. Patient's anxiety, depressive symptoms, and ADHD symptoms seem to be well-managed with current medication regimen. Given that patient is doing well on medication regimen will continue Lexapro 20 mg daily, Strattera 40 mg daily, and BuSpar 10 mg twice daily. Encouraged patient to continue with therapy. Advised that patient continue to take his blood pressure at home and to notify provider if systolic blood pressure is ever above 139 and/or diastolic blood pressure is ever above 89. Patient's blood pressures are always elevated in office as he states he always gets nervous before getting his blood pressure taken but blood pressure remains normal at home. Will see patient in 3 months patient knows he can reach out sooner if needed.     Today patient reports to be doing well. Reports to still feel anxious about half the days the week but feels it is manageable.  Patient is experiencing a lot of anticipatory anxiety with graduation coming up and having to interview for jobs.  Patient is currently in therapy.  Reports feel down at times but states he is usually a happy person.  Patient states that he is satisfied with his current medication regimen.  Patient feels that his ADHD symptoms are well-managed with the Strattera. Reports to be sleeping 7 to 8 hours each night.  Denies any side effects to his medications.  No change in appetite.  Denies SI/HI/AVH.      Subjective      Review of Systems:   Review of Systems   Constitutional:  Negative for appetite change.   Respiratory:  Negative for chest tightness and shortness of breath.    Gastrointestinal:  Negative for abdominal pain, constipation, diarrhea, nausea and vomiting.    Genitourinary:  Negative for difficulty urinating.   Neurological:  Negative for headaches.   Psychiatric/Behavioral:  Negative for hallucinations and suicidal ideas. The patient is nervous/anxious.      PHQ-9 Depression Screening  Little interest or pleasure in doing things? Several days   Feeling down, depressed, or hopeless? Several days   PHQ-2 Total Score 2   Trouble falling or staying asleep, or sleeping too much? Several days   Feeling tired or having little energy? Not at all   Poor appetite or overeating? Not at all   Feeling bad about yourself - or that you are a failure or have let yourself or your family down? Several days   Trouble concentrating on things, such as reading the newspaper or watching television? More than half the days   Moving or speaking so slowly that other people could have noticed? Or the opposite - being so fidgety or restless that you have been moving around a lot more than usual? Not at all     Thoughts that you would be better off dead, or of hurting yourself in some way? Not at all   PHQ-9 Total Score 6   If you checked off any problems, how difficult have these problems made it for you to do your work, take care of things at home, or get along with other people? Not difficult at all       DALTON-7      Over the last two weeks, how often have you been bothered by the following problems?  Feeling nervous, anxious or on edge: More than half the days  Not being able to stop or control worrying: More than half the days  Worrying too much about different things: More than half the days  Trouble Relaxing: Nearly every day  Being so restless that it is hard to sit still: More than half the days  Becoming easily annoyed or irritable: More than half the days  Feeling afraid as if something awful might happen: Several days  DALTON 7 Total Score: 14  If you checked any problems, how difficult have these problems made it for you to do your work, take care of things at home, or get along with  other people: Somewhat difficult    Patient History:   The following portions of the patient's history were reviewed and updated as appropriate: allergies, current medications, past family history, past medical history, past social history, past surgical history and problem list.     Social History     Socioeconomic History    Marital status: Single   Tobacco Use    Smoking status: Never    Smokeless tobacco: Never    Tobacco comments:     States that he never smoked cigarettes, but used to vape, is now using nicorette gum   Vaping Use    Vaping status: Former    Substances: Nicotine, THC, Flavoring    Devices: Disposable   Substance and Sexual Activity    Alcohol use: Yes     Comment: once a week    Drug use: Not Currently     Types: Marijuana     Comment: no Marijuana since 2/2025    Sexual activity: Yes     Partners: Female       Medications:     Current Outpatient Medications:     atomoxetine (Strattera) 40 MG capsule, Take 1 capsule by mouth Daily for 90 days., Disp: 90 capsule, Rfl: 0    busPIRone (BUSPAR) 10 MG tablet, Take 1 tablet by mouth 2 (Two) Times a Day for 90 days., Disp: 180 tablet, Rfl: 0    escitalopram (Lexapro) 20 MG tablet, Take 1 tablet by mouth Daily for 90 days., Disp: 90 tablet, Rfl: 0    ciprofloxacin (CIPRO) 250 MG tablet, Take 1 tablet by mouth 2 (Two) Times a Day. (Patient not taking: Reported on 4/24/2025), Disp: 10 tablet, Rfl: 0    cyclobenzaprine (FLEXERIL) 10 MG tablet, 1 tablet. (Patient not taking: Reported on 4/24/2025), Disp: , Rfl:     diclofenac (VOLTAREN) 75 MG EC tablet, 1 tablet. (Patient not taking: Reported on 4/24/2025), Disp: , Rfl:     mupirocin (BACTROBAN) 2 % ointment, Apply 1 Application topically to the appropriate area as directed 3 (Three) Times a Day. (Patient not taking: Reported on 4/24/2025), Disp: 22 g, Rfl: 0    Objective     Physical Exam:  Vital Signs:   Vitals:    04/24/25 1309   BP: 144/82   Pulse: 92   SpO2: 99%   Weight: 87.2 kg (192 lb 3.2 oz)  "  Height: 188 cm (74.02\")     Body mass index is 24.67 kg/m².     Mental Status Exam:   MENTAL STATUS EXAM   General Appearance:  Cleanly groomed and dressed  Eye Contact:  Good eye contact  Attitude:  Cooperative  Motor Activity:  Normal gait, posture  Muscle Strength:  Normal  Speech:  Normal rate, tone, volume  Language:  Spontaneous  Mood and affect:  Normal, pleasant  Hopelessness:  Denies  Loneliness: Denies  Thought Process:  Logical  Associations/ Thought Content:  No delusions  Hallucinations:  None  Suicidal Ideations:  Not present  Homicidal Ideation:  Not present  Sensorium:  Alert and clear  Orientation:  Person, place, time and situation  Attention Span/ Concentration:  Good  Fund of Knowledge:  Appropriate for age and educational level  Intellectual Functioning:  Average range  Insight:  Good  Judgement:  Good  Reliability:  Good  Impulse Control:  Good       @RESULASTCBCDIFFPANEL,TSH,LABLIPI,HHZTAKFR13,KXGVFWOE22,MG,FOLATE,PROLACTIN,CRPRESULT,CMP,[id number])@    Lab Results   Component Value Date    GLUCOSE 85 09/15/2023    BUN 15 09/15/2023    CREATININE 1.24 09/15/2023    EGFR 85 09/15/2023    BCR 12 09/15/2023    K 5.2 09/15/2023    CO2 28 09/15/2023    CALCIUM 10.1 09/15/2023    ALBUMIN 5.1 09/15/2023    BILITOT 0.6 09/15/2023    AST 23 09/15/2023    ALT 16 09/15/2023       Lab Results   Component Value Date    WBC 5.5 09/15/2023    HGB 15.9 09/15/2023    HCT 47.1 09/15/2023    MCV 89 09/15/2023     09/15/2023       Lab Results   Component Value Date    CHLPL 173 09/15/2023    TRIG 46 09/15/2023    HDL 85 09/15/2023    LDL 78 09/15/2023              Assessment / Plan      Assessment:   Patient is a 24-year-old male with a history of ADHD-predominantly inattentive type and DALTON with panic attacks.  Patient was last seen in office on 3/14/2025 when he reported worsening in his anxiety with adding BuSpar.  Patient seemed to be doing well in regards to anxiety, depressive symptoms, and ADHD " symptoms.  Patient was continued on Lexapro 20 mg daily, BuSpar 10 mg twice daily, and Strattera 40 mg daily. Today patient reports to be doing well.  Patient is elevated DALTON-7 score seems to be very situational with graduation and job interviews coming up.  Patient feels that although he is anxious about half the days the week he finds it manageable currently.  Since patient seems to be doing well, will continue Lexapro 20 mg daily, BuSpar 10 mg twice daily, and Strattera 40 mg daily.  Patient's blood pressure elevated in clinic but patient states that his blood pressure at home runs 120s over 80s.  Encourage patient to continue taking his blood pressure.  Encourage patient to continue with therapy.  Will see patient in 2 months but patient knows he can reach out sooner if needed.    Diagnoses and all orders for this visit:    1. Generalized anxiety disorder with panic attacks (Primary)  -     escitalopram (Lexapro) 20 MG tablet; Take 1 tablet by mouth Daily for 90 days.  Dispense: 90 tablet; Refill: 0    2. Attention deficit hyperactivity disorder (ADHD), predominantly inattentive type    Other orders  -     busPIRone (BUSPAR) 10 MG tablet; Take 1 tablet by mouth 2 (Two) Times a Day for 90 days.  Dispense: 180 tablet; Refill: 0  -     atomoxetine (Strattera) 40 MG capsule; Take 1 capsule by mouth Daily for 90 days.  Dispense: 90 capsule; Refill: 0       Plan/patient education:   Continue Strattera 40 mg daily, BuSpar 10 mg twice daily, and Lexapro 20 mg daily.  Continue with therapy.  Advised that patient continue to take his blood pressure at home and to notify provider if systolic blood pressure is ever above 139 and/or diastolic blood pressure is ever above 89.  Patient's blood pressures are always elevated in office as he states he always gets nervous before getting his blood pressure taken but blood pressure remains normal at home.   Discussed medication options and treatment plan of prescribed medication as  well as the risks, benefits, and side effects   Encouraged pt to continue supportive psychotherapy efforts and medications as indicated.  Educated pt on signs and symptoms of serotonin syndrome and notified pt to go to the ER if experiencing these symptoms.   Notified pt that antidepressants can sometimes cause worsening SI and to monitor for this.      Short-term goals: Continue to develop rapport with patient.     Long-term goals: See symptom reduction with medication and therapy.     Weakness: None noted     Strength: Great support from family.    Continue supportive psychotherapy efforts and medications as indicated. Treatment and medication options discussed during today's visit. Patient ackowledged and verbally consented to continue with current treatment plan and was educated on the importance of compliance with treatment and follow-up appointments. Patient seems reasonably able to adhere to treatment plan.      Medication Considerations:  Discussed medication options and treatment plan of prescribed medication(s) as well as the risks, benefits, and potential side effects. Patient is agreeable to call the office with any worsening of symptoms or onset of side effects. Patient is agreeable to call 911 or go to the nearest ER should he/she begin having SI/HI.    Quality Measures:   Patient no longer uses tobacco products.     Davie reviewed and is appropriate.    Follow Up:   Return in about 2 months (around 6/24/2025) for Recheck.    Copied text in this note has been reviewed and is accurate as of 4/24/2025.    Part of this note may be an electronic transcription/translation of spoken language to printed text using the Dragon Dictation System.    BRI Rboert, PMHNP-BC

## (undated) DEVICE — BLD DISSCT COOL CUT SJ CRVD 4MM 13CM

## (undated) DEVICE — GLV SURG SENSICARE POLYISPRN W/ALOE PF LF 6.5 GRN STRL

## (undated) DEVICE — BNDG ELAS ELITE V/CLOSE 4IN 5YD LF STRL

## (undated) DEVICE — GLV SURG BIOGEL LTX PF 6 1/2

## (undated) DEVICE — SUT VIC 2/0 CT2 27IN J269H

## (undated) DEVICE — DISPOSABLE TOURNIQUET CUFF SINGLE BLADDER, SINGLE PORT AND QUICK CONNECT CONNECTOR: Brand: COLOR CUFF

## (undated) DEVICE — UNDYED BRAIDED (POLYGLACTIN 910), SYNTHETIC ABSORBABLE SUTURE: Brand: COATED VICRYL

## (undated) DEVICE — ACL GRAFT KNIFE 10MM

## (undated) DEVICE — PRECISION W/10.0MM STOP (9.2 X 0.51 X 18.4MM)

## (undated) DEVICE — TUBING, SUCTION, 1/4" X 20', STRAIGHT: Brand: MEDLINE INDUSTRIES, INC.

## (undated) DEVICE — TBG ARTHSCP PT W CONN/REDUC 8FT

## (undated) DEVICE — KT ACL TRANSTIB WO/ SAWBLD

## (undated) DEVICE — 3M™ STERI-STRIP™ COMPOUND BENZOIN TINCTURE 40 BAGS/CARTON 4 CARTONS/CASE C1544: Brand: 3M™ STERI-STRIP™

## (undated) DEVICE — UNDERCAST PADDING: Brand: DEROYAL

## (undated) DEVICE — SKIN PREP TRAY W/CHG: Brand: MEDLINE INDUSTRIES, INC.

## (undated) DEVICE — TBG ARTHSCP PUMP W CONN/REDUC 8FT

## (undated) DEVICE — REAMR LP 10MM  STRL

## (undated) DEVICE — BRACE KN P/OP TELESCP COOL TROM STD SZ

## (undated) DEVICE — BLD SHAVER BONECUTTER 5MM 13CM

## (undated) DEVICE — PK ACL 40

## (undated) DEVICE — DRSNG WND GZ CURAD OIL EMULSION 3X3IN STRL

## (undated) DEVICE — POOLE SUCTION HANDLE: Brand: CARDINAL HEALTH

## (undated) DEVICE — ABL ASP APOLLO RF XL 90D

## (undated) DEVICE — DRP C/ARM 41X74IN